# Patient Record
Sex: FEMALE | Race: WHITE | NOT HISPANIC OR LATINO | Employment: FULL TIME | URBAN - METROPOLITAN AREA
[De-identification: names, ages, dates, MRNs, and addresses within clinical notes are randomized per-mention and may not be internally consistent; named-entity substitution may affect disease eponyms.]

---

## 2017-03-27 ENCOUNTER — OFFICE VISIT (OUTPATIENT)
Dept: FAMILY MEDICINE CLINIC | Facility: CLINIC | Age: 22
End: 2017-03-27

## 2017-03-27 VITALS
TEMPERATURE: 99 F | SYSTOLIC BLOOD PRESSURE: 110 MMHG | BODY MASS INDEX: 22.92 KG/M2 | OXYGEN SATURATION: 99 % | WEIGHT: 142.6 LBS | DIASTOLIC BLOOD PRESSURE: 70 MMHG | HEART RATE: 76 BPM | HEIGHT: 66 IN

## 2017-03-27 DIAGNOSIS — J02.9 ACUTE PHARYNGITIS, UNSPECIFIED ETIOLOGY: ICD-10-CM

## 2017-03-27 DIAGNOSIS — J20.9 ACUTE BRONCHITIS, UNSPECIFIED ORGANISM: Primary | ICD-10-CM

## 2017-03-27 LAB
EXPIRATION DATE: NORMAL
FLUAV AG NPH QL: NEGATIVE
FLUBV AG NPH QL: NEGATIVE
INTERNAL CONTROL: NORMAL
Lab: NORMAL

## 2017-03-27 PROCEDURE — 87804 INFLUENZA ASSAY W/OPTIC: CPT | Performed by: FAMILY MEDICINE

## 2017-03-27 PROCEDURE — 99213 OFFICE O/P EST LOW 20 MIN: CPT | Performed by: FAMILY MEDICINE

## 2017-03-27 RX ORDER — AMOXICILLIN AND CLAVULANATE POTASSIUM 875; 125 MG/1; MG/1
1 TABLET, FILM COATED ORAL 2 TIMES DAILY
Qty: 20 TABLET | Refills: 0 | Status: SHIPPED | OUTPATIENT
Start: 2017-03-27 | End: 2017-09-21

## 2017-03-27 RX ORDER — ALBUTEROL SULFATE 90 UG/1
2 AEROSOL, METERED RESPIRATORY (INHALATION) EVERY 4 HOURS PRN
Qty: 3 INHALER | Refills: 1 | Status: SHIPPED | OUTPATIENT
Start: 2017-03-27 | End: 2017-09-06 | Stop reason: SDUPTHER

## 2017-03-27 NOTE — PROGRESS NOTES
"  Chief Complaint   Patient presents with   • Nasal Congestion     pt ois been with conestion ,chills and sore throat since yesterday ,she is taking loratadine,       Subjective.../HPI  Patient present today with sore throat and muscle aches and chills and rattling in chest    I have reviewed the patient's medical history in detail and updated the computerized patient record.    No family history on file.    Social History     Social History   • Marital status: Single     Spouse name: N/A   • Number of children: N/A   • Years of education: N/A     Occupational History   • Not on file.     Social History Main Topics   • Smoking status: Never Smoker   • Smokeless tobacco: Not on file   • Alcohol use Yes      Comment: \"socially\"   • Drug use: No   • Sexual activity: Defer     Other Topics Concern   • Not on file     Social History Narrative       Review of Systems:   Review of Systems   Constitutional: Positive for chills and fatigue.   HENT: Positive for congestion, ear pain (both ears), sneezing, sore throat and voice change.    Eyes: Negative.    Respiratory: Positive for cough.    Cardiovascular: Negative.    Gastrointestinal: Negative.    Endocrine: Negative.    Genitourinary: Negative.    Allergic/Immunologic: Negative.    Neurological: Negative.    Hematological: Negative.    Psychiatric/Behavioral: Negative.        Objective:  Vital Signs     Vitals:    03/27/17 1838   BP: 110/70   BP Location: Left arm   Patient Position: Sitting   Pulse: 76   Temp: 99 °F (37.2 °C)   TempSrc: Oral   SpO2: 99%   Weight: 142 lb 9.6 oz (64.7 kg)   Height: 65.5\" (166.4 cm)     Physical Exam   Constitutional: She is oriented to person, place, and time. She appears well-developed and well-nourished.   HENT:   Head: Normocephalic.   Right Ear: External ear normal.   Left Ear: External ear normal.   Red throat   Eyes: Pupils are equal, round, and reactive to light.   Neck: Normal range of motion.   Cardiovascular: Normal rate and " regular rhythm.    Pulmonary/Chest: Effort normal. She has rales (slight coarse breath sounds).   Abdominal: Soft.   Musculoskeletal: Normal range of motion.   Neurological: She is alert and oriented to person, place, and time.   Skin: Skin is warm and dry.        Results Review:      REVIEWED AND DISCUSSED LAB RESULTS WITH PATIENT and REVIEWED AND DISCUSSED CLINICAL RESULTS WITH PATIENT                          Current Outpatient Prescriptions:   •  albuterol (PROAIR HFA) 108 (90 BASE) MCG/ACT inhaler, Inhale 2 puffs Every 4 (Four) Hours As Needed for Wheezing., Disp: 3 inhaler, Rfl: 1  •  etonogestrel-ethinyl estradiol (NUVARING) 0.12-0.015 MG/24HR vaginal ring, Insert 1 each into the vagina every 28 (twenty-eight) days. Insert vaginally and leave in place for 3 consecutive weeks, then remove for 1 week., Disp: , Rfl:   •  LORATADINE ALLERGY RELIEF PO, Take  by mouth., Disp: , Rfl:   •  amoxicillin-clavulanate (AUGMENTIN) 875-125 MG per tablet, Take 1 tablet by mouth 2 (Two) Times a Day., Disp: 20 tablet, Rfl: 0  •  Azelastine-Fluticasone (DYMISTA) 137-50 MCG/ACT suspension, 1 spray into each nostril 2 (two) times a day., Disp: 1 bottle, Rfl: 11    Procedures    Assessment/Plan     Diagnoses and all orders for this visit:    Acute bronchitis, unspecified organism  -     POCT Influenza A/B    Acute pharyngitis, unspecified etiology  -     POCT Influenza A/B    Other orders  -     amoxicillin-clavulanate (AUGMENTIN) 875-125 MG per tablet; Take 1 tablet by mouth 2 (Two) Times a Day.  -     albuterol (PROAIR HFA) 108 (90 BASE) MCG/ACT inhaler; Inhale 2 puffs Every 4 (Four) Hours As Needed for Wheezing.       pt wants to take meds and hold on rapid strep    Liborio Marcos Pedro Jr., MD  03/27/17  6:53 PM

## 2017-03-29 ENCOUNTER — TELEPHONE (OUTPATIENT)
Dept: FAMILY MEDICINE CLINIC | Facility: CLINIC | Age: 22
End: 2017-03-29

## 2017-03-29 NOTE — TELEPHONE ENCOUNTER
----- Message from Corin Frausto sent at 3/29/2017  9:35 AM EDT -----  Ph [phone]  [facility] [address]  Pt was given amoxicillin-clavulanate (AUGMENTIN) 875-125 MG per tablet and she now has diarrhea, she wants to know if she can get something else called in     Also she wants to know if she can get a note cause she missed school?    lov 3/27/17 for bronchitis  NKDA

## 2017-09-13 RX ORDER — ALBUTEROL SULFATE 90 UG/1
2 AEROSOL, METERED RESPIRATORY (INHALATION)
Qty: 25.5 G | Refills: 1 | Status: SHIPPED | OUTPATIENT
Start: 2017-09-13 | End: 2020-07-14 | Stop reason: SDUPTHER

## 2017-09-21 ENCOUNTER — HOSPITAL ENCOUNTER (OUTPATIENT)
Dept: GENERAL RADIOLOGY | Facility: HOSPITAL | Age: 22
Discharge: HOME | End: 2017-09-21
Attending: FAMILY MEDICINE | Admitting: FAMILY MEDICINE

## 2017-09-21 ENCOUNTER — OFFICE VISIT (OUTPATIENT)
Dept: FAMILY MEDICINE CLINIC | Facility: CLINIC | Age: 22
End: 2017-09-21

## 2017-09-21 VITALS
TEMPERATURE: 98.7 F | HEIGHT: 66 IN | OXYGEN SATURATION: 98 % | BODY MASS INDEX: 22.08 KG/M2 | SYSTOLIC BLOOD PRESSURE: 100 MMHG | HEART RATE: 84 BPM | WEIGHT: 137.4 LBS | DIASTOLIC BLOOD PRESSURE: 64 MMHG

## 2017-09-21 DIAGNOSIS — S99.922A INJURY OF LEFT FOOT, INITIAL ENCOUNTER: Primary | ICD-10-CM

## 2017-09-21 PROCEDURE — 73630 X-RAY EXAM OF FOOT: CPT

## 2017-09-21 PROCEDURE — 99213 OFFICE O/P EST LOW 20 MIN: CPT | Performed by: FAMILY MEDICINE

## 2017-09-21 NOTE — PROGRESS NOTES
"  Chief Complaint   Patient presents with   • Ankle Injury     last sunday pt felt on hole and has swelling and pain in left foot       Subjective.../HPI  Patient present today with hx of stepping off curb x 4 days ago and pain and swelling and bruising in left lat foot  I have reviewed the patient's medical history in detail and updated the computerized patient record.    History reviewed. No pertinent family history.    Social History     Social History   • Marital status: Single     Spouse name: N/A   • Number of children: N/A   • Years of education: N/A     Occupational History   • Not on file.     Social History Main Topics   • Smoking status: Never Smoker   • Smokeless tobacco: Not on file   • Alcohol use Yes      Comment: \"socially\"   • Drug use: No   • Sexual activity: Defer     Other Topics Concern   • Not on file     Social History Narrative       Review of Systems:   Review of Systems   Musculoskeletal: Positive for joint swelling and myalgias.        Left foot and ankle pain       Objective:  Vital Signs     Vitals:    09/21/17 1107   BP: 100/64   BP Location: Left arm   Patient Position: Sitting   Pulse: 84   Temp: 98.7 °F (37.1 °C)   SpO2: 98%   Weight: 137 lb 6.4 oz (62.3 kg)   Height: 66\" (167.6 cm)     Physical Exam   Constitutional: She is oriented to person, place, and time. She appears well-developed and well-nourished.   HENT:   Head: Normocephalic.   Eyes: Pupils are equal, round, and reactive to light.   Neck: Normal range of motion.   Cardiovascular: Normal rate, regular rhythm and normal heart sounds.    Pulmonary/Chest: Effort normal.   Abdominal: Soft.   Musculoskeletal: Normal range of motion. She exhibits tenderness (pain and bruising over proximal left 5th metatarsal).   Neurological: She is alert and oriented to person, place, and time.   Skin: Skin is warm and dry.        Results Review:      REVIEWED AND DISCUSSED CLINICAL RESULTS WITH PATIENT                          Current " Outpatient Prescriptions:   •  albuterol (PROAIR HFA) 108 (90 Base) MCG/ACT inhaler, Inhale 2 puffs 4 (Four) Times a Day., Disp: 25.5 g, Rfl: 1  •  etonogestrel-ethinyl estradiol (NUVARING) 0.12-0.015 MG/24HR vaginal ring, Insert 1 each into the vagina every 28 (twenty-eight) days. Insert vaginally and leave in place for 3 consecutive weeks, then remove for 1 week., Disp: , Rfl:   •  LORATADINE ALLERGY RELIEF PO, Take  by mouth., Disp: , Rfl:     Procedures    Assessment/Plan     Diagnoses and all orders for this visit:    Injury of left foot, initial encounter  -     XR Foot 3+ View Left         Liborio Marcos Pedro Jr., MD  09/21/17  12:24 PM

## 2017-09-22 ENCOUNTER — TELEPHONE (OUTPATIENT)
Dept: FAMILY MEDICINE CLINIC | Facility: CLINIC | Age: 22
End: 2017-09-22

## 2017-09-22 NOTE — TELEPHONE ENCOUNTER
----- Message from Corin Frausto sent at 9/22/2017 12:36 PM EDT -----  Ph [phone]    X-ray LT foot results   Last office visit 9/21/17

## 2017-09-26 ENCOUNTER — TELEPHONE (OUTPATIENT)
Dept: FAMILY MEDICINE CLINIC | Facility: CLINIC | Age: 22
End: 2017-09-26

## 2018-01-05 ENCOUNTER — OFFICE VISIT (OUTPATIENT)
Dept: FAMILY MEDICINE CLINIC | Facility: CLINIC | Age: 23
End: 2018-01-05

## 2018-01-05 VITALS
SYSTOLIC BLOOD PRESSURE: 102 MMHG | HEART RATE: 78 BPM | HEIGHT: 66 IN | DIASTOLIC BLOOD PRESSURE: 70 MMHG | OXYGEN SATURATION: 98 % | BODY MASS INDEX: 22.02 KG/M2 | WEIGHT: 137 LBS | TEMPERATURE: 98 F

## 2018-01-05 DIAGNOSIS — S93.491A SPRAIN OF POSTERIOR TALOFIBULAR LIGAMENT OF RIGHT ANKLE, INITIAL ENCOUNTER: Primary | ICD-10-CM

## 2018-01-05 DIAGNOSIS — Z23 NEED FOR TETANUS BOOSTER: ICD-10-CM

## 2018-01-05 DIAGNOSIS — J45.20 MILD INTERMITTENT ASTHMA, UNSPECIFIED WHETHER COMPLICATED: ICD-10-CM

## 2018-01-05 PROBLEM — J45.909 ASTHMA: Status: ACTIVE | Noted: 2018-01-05

## 2018-01-05 PROBLEM — Z30.44 ENCOUNTER FOR SURVEILLANCE OF VAGINAL RING HORMONAL CONTRACEPTIVE DEVICE: Status: ACTIVE | Noted: 2018-01-05

## 2018-01-05 PROBLEM — J30.2 SEASONAL ALLERGIES: Status: ACTIVE | Noted: 2018-01-05

## 2018-01-05 PROCEDURE — 90714 TD VACC NO PRESV 7 YRS+ IM: CPT | Performed by: FAMILY MEDICINE

## 2018-01-05 PROCEDURE — 90471 IMMUNIZATION ADMIN: CPT | Performed by: FAMILY MEDICINE

## 2018-01-05 PROCEDURE — 99214 OFFICE O/P EST MOD 30 MIN: CPT | Performed by: FAMILY MEDICINE

## 2018-01-05 NOTE — PROGRESS NOTES
Subjective   Jeanne Wagner is a 22 y.o. female.     Chief Complaint   Patient presents with   • Asthma     follow up pt doing well no complains       HPI     Patient is +22-year-old female here to establish care and FU asthma.  She has a positive history of asthma and fibromyalgia.  No SOA or wheezing last used inhaler 3 months ago.  LMP was 12/29/17 - on Nuva Ring.  Without contraception she does not have menses. Exercises on days she doesn't have work.     She also has a history of repetitive ankle strains.  She twisted her ankle a couple weeks ago and feels edematous on the right side.  She has seen physical therapists in the past and has home exercises with elastic bands she doesn't regular basis.  He also played soccer competitively in things that she probably injured her ankle repetitively during those years.    The following portions of the patient's history were reviewed and updated as appropriate: allergies, current medications, past family history, past medical history, past social history, past surgical history and problem list.    Review of Systems   Constitutional: Negative for fever and unexpected weight change.   HENT: Negative for dental problem.    Respiratory: Negative for shortness of breath.    Cardiovascular: Negative for chest pain.   Gastrointestinal: Negative for blood in stool.   Genitourinary: Negative for dysuria.   Skin: Negative for rash.   Allergic/Immunologic: Negative for environmental allergies.   Neurological: Negative for syncope.   Psychiatric/Behavioral: The patient is not nervous/anxious.    All other systems reviewed and are negative.      Objective  Vitals:    01/05/18 1428   BP: 102/70   Pulse: 78   Temp: 98 °F (36.7 °C)   SpO2: 98%        Physical Exam   Constitutional: She is oriented to person, place, and time. She appears well-developed and well-nourished. No distress.   HENT:   Head: Normocephalic.   Right Ear: External ear normal.   Left Ear: External ear normal.    Nose: Nose normal.   Eyes: EOM are normal.   Cardiovascular: Normal rate, regular rhythm, normal heart sounds and intact distal pulses.    No murmur heard.  Pulmonary/Chest: Effort normal and breath sounds normal. No respiratory distress.   Musculoskeletal: Normal range of motion.   Right ankle more edematous than the left, tender along the posterior aspect of the malleolus.  Able to bear weight and walk with a normal gait.   Neurological: She is alert and oriented to person, place, and time.   Skin: Skin is warm and dry. No rash noted.   Psychiatric: She has a normal mood and affect. Her behavior is normal. Judgment and thought content normal.   Nursing note and vitals reviewed.        Current Outpatient Prescriptions:   •  albuterol (PROAIR HFA) 108 (90 Base) MCG/ACT inhaler, Inhale 2 puffs 4 (Four) Times a Day., Disp: 25.5 g, Rfl: 1  •  etonogestrel-ethinyl estradiol (NUVARING) 0.12-0.015 MG/24HR vaginal ring, Insert 1 each into the vagina every 28 (twenty-eight) days. Insert vaginally and leave in place for 3 consecutive weeks, then remove for 1 week., Disp: , Rfl:   •  LORATADINE ALLERGY RELIEF PO, Take  by mouth., Disp: , Rfl:     Procedures    Lab Results (most recent)     None          Assessment/Plan   Jeanne was seen today for asthma.    Diagnoses and all orders for this visit:    Sprain of posterior talofibular ligament of right ankle, initial encounter    Mild intermittent asthma, unspecified whether complicated    Need for tetanus booster  -     Td Vaccine Greater Than or Equal To 8yo Preservative Free IM      ANKLE: RICE, continue home exercises, ibuprofen when necessary  Asthma well-controlled on current therapy.  Continue follow-up with gynecologist, likely have IUD placed.    Return if symptoms worsen or fail to improve.    15 minutes was spent of the 25 minute visit in direct counseling and coordination of care regarding:   Encounter Diagnoses   Name Primary?   • Mild intermittent asthma,  unspecified whether complicated    • Need for tetanus booster    • Sprain of posterior talofibular ligament of right ankle, initial encounter Yes         Giana Alva MD

## 2018-08-07 ENCOUNTER — OFFICE VISIT (OUTPATIENT)
Dept: FAMILY MEDICINE CLINIC | Facility: CLINIC | Age: 23
End: 2018-08-07

## 2018-08-07 VITALS
HEIGHT: 66 IN | WEIGHT: 137 LBS | TEMPERATURE: 98.5 F | DIASTOLIC BLOOD PRESSURE: 64 MMHG | SYSTOLIC BLOOD PRESSURE: 98 MMHG | BODY MASS INDEX: 22.02 KG/M2 | OXYGEN SATURATION: 98 % | HEART RATE: 78 BPM

## 2018-08-07 DIAGNOSIS — S93.491A SPRAIN OF POSTERIOR TALOFIBULAR LIGAMENT OF RIGHT ANKLE, INITIAL ENCOUNTER: Primary | ICD-10-CM

## 2018-08-07 PROCEDURE — 99214 OFFICE O/P EST MOD 30 MIN: CPT | Performed by: FAMILY MEDICINE

## 2018-08-07 NOTE — PROGRESS NOTES
"    Jeanne Wagner is a 23 y.o. female.     Chief Complaint   Patient presents with   • Ankle Pain     right ankle pain  pt injured in the past now pain is back        HPI     Pt is a pleasant 23 y.o. YO female here for R ankle pain.     R ankle pain: new problem to me, she has injured in the past, pain in the lateral R ankle, she feels that it is swollen, tender, weak.  It first started her sophomore year of highschool, she does not recall a specific injury but has been playing sports.  She has been treated with crutches, PT, NSAIDs and Ice in the past that did resolve symptoms but have recurred. She can run for 6 miles and it doesn't get worst, but it feels \"tired\" like it will give out on her but it hasn't.  She does have a topical med that does help.      The following portions of the patient's history were reviewed and updated as appropriate: allergies, current medications, past family history, past medical history, past social history, past surgical history and problem list.    Review of Systems   Constitutional: Negative for fatigue and fever.   Musculoskeletal: Positive for gait problem and myalgias.        Right ankle pain       Objective  Vitals:    08/07/18 0828   BP: 98/64   Pulse: 78   Temp: 98.5 °F (36.9 °C)   SpO2: 98%        Physical Exam   Constitutional: She is oriented to person, place, and time. She appears well-developed and well-nourished. No distress.   HENT:   Head: Normocephalic.   Nose: Nose normal.   Eyes: EOM are normal. No scleral icterus.   Pulmonary/Chest: Effort normal. No respiratory distress.   Musculoskeletal: Normal range of motion.   R foot with mild edema in the posterior to the malleolus on the lateral aspect of the ankle.  No tenderness over the medial or lateral malleolus, navicular, calcaneus.  Normal range of motion.  No pain with flexion or extension of the foot.  Able to bear weight with a normal gait.   Neurological: She is alert and oriented to person, place, and " time.   Skin: Skin is warm and dry. No rash noted.   Psychiatric: She has a normal mood and affect. Her behavior is normal. Judgment and thought content normal.   Nursing note and vitals reviewed.        Current Outpatient Prescriptions:   •  Levonorgestrel (CHARLIE) 13.5 MG intrauterine device IUD, 1 each by Intrauterine route 1 (One) Time., Disp: , Rfl:   •  LORATADINE ALLERGY RELIEF PO, Take  by mouth., Disp: , Rfl:   •  albuterol (PROAIR HFA) 108 (90 Base) MCG/ACT inhaler, Inhale 2 puffs 4 (Four) Times a Day., Disp: 25.5 g, Rfl: 1  •  etonogestrel-ethinyl estradiol (NUVARING) 0.12-0.015 MG/24HR vaginal ring, Insert 1 each into the vagina every 28 (twenty-eight) days. Insert vaginally and leave in place for 3 consecutive weeks, then remove for 1 week., Disp: , Rfl:     Procedures    Lab Results (most recent)     None              Jeanne was seen today for ankle pain.    Diagnoses and all orders for this visit:    Sprain of posterior talofibular ligament of right ankle, initial encounter  -     Ambulatory Referral to Physical Therapy Evaluate and treat      Patient with a new problem to me of sprain of the right ankle.  It is not improving, continue with NSAIDs and ice.  Referral to physical therapy prescribed.  She does not want surgery at this time, therefore no MRI at this time.  She does affirm some instability think would improve with ankle exercises.  She is okay to continue running as long as this does not exacerbate her pain.    Return in about 6 weeks (around 9/18/2018), or if symptoms worsen or fail to improve, for Recheck R ankle pain.      Giana Alva MD

## 2018-12-19 ENCOUNTER — OFFICE VISIT (OUTPATIENT)
Dept: FAMILY MEDICINE CLINIC | Facility: CLINIC | Age: 23
End: 2018-12-19

## 2018-12-19 VITALS
HEIGHT: 66 IN | SYSTOLIC BLOOD PRESSURE: 112 MMHG | BODY MASS INDEX: 21.69 KG/M2 | OXYGEN SATURATION: 98 % | WEIGHT: 135 LBS | TEMPERATURE: 98.2 F | DIASTOLIC BLOOD PRESSURE: 70 MMHG | HEART RATE: 67 BPM

## 2018-12-19 DIAGNOSIS — J06.9 ACUTE URI: Primary | ICD-10-CM

## 2018-12-19 PROCEDURE — 99213 OFFICE O/P EST LOW 20 MIN: CPT | Performed by: FAMILY MEDICINE

## 2018-12-19 RX ORDER — PSEUDOEPHEDRINE HCL 120 MG/1
120 TABLET, FILM COATED, EXTENDED RELEASE ORAL EVERY 12 HOURS
Qty: 45 TABLET | Refills: 3 | Status: SHIPPED | OUTPATIENT
Start: 2018-12-19 | End: 2019-01-11

## 2018-12-19 RX ORDER — GUAIFENESIN AND CODEINE PHOSPHATE 100; 10 MG/5ML; MG/5ML
5 SOLUTION ORAL 3 TIMES DAILY PRN
Qty: 180 ML | Refills: 0 | Status: SHIPPED | OUTPATIENT
Start: 2018-12-19 | End: 2019-01-11

## 2018-12-19 NOTE — PROGRESS NOTES
Jeanne Wagner is a 23 y.o. female.     Chief Complaint   Patient presents with   • Cough     congestion and cough for about 4 days and sore throat       HPI     Pt is a pleasant 23 y.o. YO female here for respiratory infection for 4 days.  She has cough, congestion, sore throat and laryngitis.  She has no shortness of breath or wheezing.  She is not needing her inhaler more often.  She does have a nocturnal cough that is productive.  Sick contacts with young children, no Gi Sx.      The following portions of the patient's history were reviewed and updated as appropriate: allergies, current medications, past family history, past medical history, past social history, past surgical history and problem list.    Review of Systems   HENT: Positive for congestion, postnasal drip, rhinorrhea and sore throat.    Respiratory: Positive for cough, chest tightness and shortness of breath.    Allergic/Immunologic: Positive for environmental allergies.       Objective  Vitals:    12/19/18 1143   BP: 112/70   Pulse: 67   Temp: 98.2 °F (36.8 °C)   SpO2: 98%        Physical Exam   Constitutional: She is oriented to person, place, and time. She appears well-developed and well-nourished. No distress.   HENT:   Head: Normocephalic.   Right Ear: External ear normal.   Left Ear: External ear normal.   Nose: Nose normal.   Mouth/Throat: Oropharynx is clear and moist. No oropharyngeal exudate.   No sinus tenderness, erythema in the posterior oropharynx.   Eyes: Conjunctivae and EOM are normal. Pupils are equal, round, and reactive to light.   Cardiovascular: Normal rate, regular rhythm, normal heart sounds and intact distal pulses.   No murmur heard.  Pulmonary/Chest: Effort normal and breath sounds normal. No stridor. No respiratory distress. She has no wheezes.   Abdominal: Soft. Bowel sounds are normal. She exhibits no distension.   Musculoskeletal: Normal range of motion.   Neurological: She is alert and oriented to person,  place, and time.   Skin: Skin is warm and dry. No rash noted.   Psychiatric: She has a normal mood and affect. Her behavior is normal. Judgment and thought content normal.   Nursing note and vitals reviewed.        Current Outpatient Medications:   •  albuterol (PROAIR HFA) 108 (90 Base) MCG/ACT inhaler, Inhale 2 puffs 4 (Four) Times a Day., Disp: 25.5 g, Rfl: 1  •  Levonorgestrel (CHARLIE) 13.5 MG intrauterine device IUD, 1 each by Intrauterine route 1 (One) Time., Disp: , Rfl:   •  LORATADINE ALLERGY RELIEF PO, Take  by mouth., Disp: , Rfl:   •  etonogestrel-ethinyl estradiol (NUVARING) 0.12-0.015 MG/24HR vaginal ring, Insert 1 each into the vagina every 28 (twenty-eight) days. Insert vaginally and leave in place for 3 consecutive weeks, then remove for 1 week., Disp: , Rfl:   •  guaifenesin-codeine (GUAIFENESIN AC) 100-10 MG/5ML liquid, Take 5 mL by mouth 3 (Three) Times a Day As Needed for Cough., Disp: 180 mL, Rfl: 0  •  pseudoephedrine (SUDAFED 12 HOUR) 120 MG 12 hr tablet, Take 1 tablet by mouth Every 12 (Twelve) Hours., Disp: 45 tablet, Rfl: 3    Procedures    Lab Results (most recent)     None              Jeanne was seen today for cough.    Diagnoses and all orders for this visit:    Acute URI  -     guaifenesin-codeine (GUAIFENESIN AC) 100-10 MG/5ML liquid; Take 5 mL by mouth 3 (Three) Times a Day As Needed for Cough.  -     pseudoephedrine (SUDAFED 12 HOUR) 120 MG 12 hr tablet; Take 1 tablet by mouth Every 12 (Twelve) Hours.      URI likely viral.  Supportive care as above.  Rest and vitamin C.  Normal exam at this time, no sign of asthma exacerbation at this time no wheezing.  If not improving in 1 week okay to call for Augmentin.  If she gets signs of asthma exacerbation, return for further evaluation.    Return if symptoms worsen or fail to improve.      Giana Alva MD

## 2019-01-11 ENCOUNTER — OFFICE VISIT (OUTPATIENT)
Dept: FAMILY MEDICINE CLINIC | Facility: CLINIC | Age: 24
End: 2019-01-11

## 2019-01-11 VITALS
WEIGHT: 139 LBS | HEIGHT: 66 IN | DIASTOLIC BLOOD PRESSURE: 64 MMHG | OXYGEN SATURATION: 100 % | SYSTOLIC BLOOD PRESSURE: 100 MMHG | BODY MASS INDEX: 22.34 KG/M2 | TEMPERATURE: 98.9 F | HEART RATE: 104 BPM

## 2019-01-11 DIAGNOSIS — R42 VERTIGO: ICD-10-CM

## 2019-01-11 DIAGNOSIS — H65.91 FLUID LEVEL BEHIND TYMPANIC MEMBRANE OF RIGHT EAR: Primary | ICD-10-CM

## 2019-01-11 PROCEDURE — 99214 OFFICE O/P EST MOD 30 MIN: CPT | Performed by: FAMILY MEDICINE

## 2019-01-11 RX ORDER — PREDNISONE 20 MG/1
40 TABLET ORAL DAILY
Qty: 10 TABLET | Refills: 0 | Status: SHIPPED | OUTPATIENT
Start: 2019-01-11 | End: 2019-01-11 | Stop reason: SDUPTHER

## 2019-01-11 RX ORDER — PREDNISONE 20 MG/1
40 TABLET ORAL DAILY
Qty: 10 TABLET | Refills: 0 | Status: SHIPPED | OUTPATIENT
Start: 2019-01-11 | End: 2019-01-16

## 2019-01-11 NOTE — PROGRESS NOTES
Jeanne Wagner is a 23 y.o. female.     Chief Complaint   Patient presents with   • Dizziness   • Cough       HPI     Pt is a pleasant 23 y.o. YO female here for cough and Dizziness.  He was diagnosed with an upper respiratory infection in mid December, treated by supportive care with Sudafed and codeine cough syrup.  Sore throat and congestion have improved, no hearing loss, no fevers, No N/V/D.  Her ear would pop and hurt earlier this week, now popping consistently.       The following portions of the patient's history were reviewed and updated as appropriate: allergies, current medications, past family history, past medical history, past social history, past surgical history and problem list.    Review of Systems   Constitutional: Positive for fatigue.   Respiratory: Positive for cough.    Neurological: Positive for dizziness.       Objective  Vitals:    01/11/19 1000   BP: 100/64   Pulse: 104   Temp: 98.9 °F (37.2 °C)   SpO2: 100%        Physical Exam   Constitutional: She is oriented to person, place, and time. She appears well-developed and well-nourished. No distress.   HENT:   Head: Normocephalic.   Right Ear: External ear normal.   Left Ear: External ear normal.   Nose: Nose normal.   Fluid behind the right TM with no bulging or redness but reanna color   Eyes: EOM are normal.   Cardiovascular: Normal rate, regular rhythm, normal heart sounds and intact distal pulses.   No murmur heard.  Pulmonary/Chest: Effort normal and breath sounds normal. No respiratory distress.   Musculoskeletal: Normal range of motion.   Neurological: She is alert and oriented to person, place, and time.   Skin: Skin is warm and dry. No rash noted.   Psychiatric: She has a normal mood and affect. Her behavior is normal. Judgment and thought content normal.   Nursing note and vitals reviewed.        Current Outpatient Medications:   •  albuterol (PROAIR HFA) 108 (90 Base) MCG/ACT inhaler, Inhale 2 puffs 4 (Four) Times a Day.,  Disp: 25.5 g, Rfl: 1  •  Levonorgestrel (CHARLIE) 13.5 MG intrauterine device IUD, 1 each by Intrauterine route 1 (One) Time., Disp: , Rfl:   •  predniSONE (DELTASONE) 20 MG tablet, Take 2 tablets by mouth Daily for 5 days., Disp: 10 tablet, Rfl: 0    Procedures    Lab Results (most recent)     None              Jeanne was seen today for dizziness and cough.    Diagnoses and all orders for this visit:    Fluid level behind tympanic membrane of right ear  -     Discontinue: predniSONE (DELTASONE) 20 MG tablet; Take 2 tablets by mouth Daily for 5 days.  -     predniSONE (DELTASONE) 20 MG tablet; Take 2 tablets by mouth Daily for 5 days.    Vertigo    Patient with anew problem of vertigo-like symptoms and popping in the ear on the right, findings of fluid behind the TM, treated with prednisone, not improving with supportive care, been 3 weeks.  if not improving after one week of steorids return for further evaluation.  She seems to be improving from an upper respiratory infection standpoint.  No further need for antibiotics.  Patient agreed to use over-the-counter Mucinex to help with drainage of the sinuses.  If not improving may need to see an ENT.       Return if symptoms worsen or fail to improve.      Giana Alva MD

## 2019-04-01 NOTE — TELEPHONE ENCOUNTER
Pt requesting three refills. Two of these medications are not listed on pt's chart. Written previously by .    1) albuterol (PROAIR HFA) 108 (90 Base) MCG/ACT inhaler    2) Clindamycin Phosphate Gel 1.2%-5% Apply to face as needed    3) Ketoprofen 75 mg Take one capsules by mouth three daily for inflammation in knee     I did inform pt that  is on vacation she will return this week. These medications, including medications that are not on pt's medication list may not be authorized until  returns, or appointment may be needed.

## 2019-04-02 RX ORDER — ALBUTEROL SULFATE 90 UG/1
2 AEROSOL, METERED RESPIRATORY (INHALATION)
Qty: 25.5 G | Refills: 1 | Status: CANCELLED | OUTPATIENT
Start: 2019-04-02

## 2019-04-04 RX ORDER — KETOPROFEN 75 MG/1
75 CAPSULE ORAL 3 TIMES DAILY PRN
Qty: 60 CAPSULE | Refills: 2 | OUTPATIENT
Start: 2019-04-04 | End: 2020-02-24

## 2019-04-04 RX ORDER — CLINDAMYCIN PHOSPHATE AND BENZOYL PEROXIDE 10; 50 MG/G; MG/G
1 GEL TOPICAL DAILY
Qty: 45 G | Refills: 11 | Status: SHIPPED | OUTPATIENT
Start: 2019-04-04 | End: 2020-05-04 | Stop reason: SDUPTHER

## 2020-02-24 ENCOUNTER — APPOINTMENT (OUTPATIENT)
Dept: CT IMAGING | Facility: HOSPITAL | Age: 25
End: 2020-02-24

## 2020-02-24 ENCOUNTER — APPOINTMENT (OUTPATIENT)
Dept: GENERAL RADIOLOGY | Facility: HOSPITAL | Age: 25
End: 2020-02-24

## 2020-02-24 ENCOUNTER — HOSPITAL ENCOUNTER (EMERGENCY)
Facility: HOSPITAL | Age: 25
Discharge: HOME | End: 2020-02-24
Attending: EMERGENCY MEDICINE | Admitting: EMERGENCY MEDICINE

## 2020-02-24 VITALS
OXYGEN SATURATION: 98 % | DIASTOLIC BLOOD PRESSURE: 81 MMHG | SYSTOLIC BLOOD PRESSURE: 116 MMHG | HEIGHT: 65 IN | TEMPERATURE: 97.6 F | HEART RATE: 77 BPM | BODY MASS INDEX: 23.32 KG/M2 | WEIGHT: 140 LBS | RESPIRATION RATE: 17 BRPM

## 2020-02-24 DIAGNOSIS — S09.90XA MINOR HEAD INJURY, INITIAL ENCOUNTER: ICD-10-CM
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
## 2020-02-24 DIAGNOSIS — S16.1XXA STRAIN OF NECK MUSCLE, INITIAL ENCOUNTER: ICD-10-CM

## 2020-02-24 PROCEDURE — 96372 THER/PROPH/DIAG INJ SC/IM: CPT

## 2020-02-24 PROCEDURE — 99283 EMERGENCY DEPT VISIT LOW MDM: CPT

## 2020-02-24 PROCEDURE — 72050 X-RAY EXAM NECK SPINE 4/5VWS: CPT

## 2020-02-24 PROCEDURE — 63710000001 ONDANSETRON ODT 4 MG TABLET DISPERSIBLE: Performed by: EMERGENCY MEDICINE

## 2020-02-24 PROCEDURE — 70450 CT HEAD/BRAIN W/O DYE: CPT

## 2020-02-24 PROCEDURE — 25010000002 KETOROLAC TROMETHAMINE PER 15 MG: Performed by: EMERGENCY MEDICINE

## 2020-02-24 RX ORDER — KETOROLAC TROMETHAMINE 30 MG/ML
30 INJECTION, SOLUTION INTRAMUSCULAR; INTRAVENOUS ONCE
Status: COMPLETED | OUTPATIENT
Start: 2020-02-24 | End: 2020-02-24

## 2020-02-24 RX ORDER — NAPROXEN SODIUM 550 MG/1
550 TABLET ORAL 2 TIMES DAILY WITH MEALS
Qty: 20 TABLET | Refills: 0 | OUTPATIENT
Start: 2020-02-24 | End: 2022-01-17

## 2020-02-24 RX ORDER — ONDANSETRON 4 MG/1
4 TABLET, ORALLY DISINTEGRATING ORAL ONCE
Status: COMPLETED | OUTPATIENT
Start: 2020-02-24 | End: 2020-02-24

## 2020-02-24 RX ORDER — CYCLOBENZAPRINE HCL 10 MG
10 TABLET ORAL 3 TIMES DAILY PRN
Qty: 20 TABLET | Refills: 0 | OUTPATIENT
Start: 2020-02-24 | End: 2022-01-17

## 2020-02-24 RX ADMIN — ONDANSETRON 4 MG: 4 TABLET, ORALLY DISINTEGRATING ORAL at 11:32

## 2020-02-24 RX ADMIN — KETOROLAC TROMETHAMINE 30 MG: 30 INJECTION, SOLUTION INTRAMUSCULAR at 11:32

## 2020-02-24 NOTE — ED PROVIDER NOTES
" EMERGENCY DEPARTMENT ENCOUNTER    CHIEF COMPLAINT  Chief Complaint: nausea and headache  History given by: patient  History limited by: none  Room Number: 41/41  PMD: Giana Alva MD      HPI:  Pt is a 24 y.o. female who presents complaining of moderate nausea and headache s/p falling backwards and hitting head occurring 2 days ago. Pt states pain has not improved since onset. Pt is unsure if she lost consciousness. Pt reports she was playing tug of war with her dog, the dog let go and she fell backwards and hit her head. Pt also complains of neck pain. Pt has an IUD and denies chance of pregnancy.     Duration:  2 days  Intensity/Severity: moderate  Progression: unchanged  Associated Symptoms: neck pain  Alleviating Factors: none    PAST MEDICAL HISTORY  Active Ambulatory Problems     Diagnosis Date Noted   • Asthma 01/05/2018   • Seasonal allergies 01/05/2018   • Encounter for surveillance of vaginal ring hormonal contraceptive device 01/05/2018     Resolved Ambulatory Problems     Diagnosis Date Noted   • Acute sinusitis 09/07/2016     Past Medical History:   Diagnosis Date   • Fibromyalgia        PAST SURGICAL HISTORY  Past Surgical History:   Procedure Laterality Date   • KNEE ARTHROSCOPY Left        FAMILY HISTORY  Family History   Problem Relation Age of Onset   • Hypertension Mother    • Diabetes Father    • Hypertension Father    • Seizures Sister    • No Known Problems Brother    • Thyroid disease Paternal Grandmother    • Coronary artery disease Paternal Grandfather         CABG   • Stroke Other    • Colon cancer Other    • Breast cancer Neg Hx        SOCIAL HISTORY  Social History     Socioeconomic History   • Marital status: Single     Spouse name: Not on file   • Number of children: Not on file   • Years of education: Not on file   • Highest education level: Not on file   Tobacco Use   • Smoking status: Never Smoker   Substance and Sexual Activity   • Alcohol use: Yes     Comment: \"socially\"   • " Drug use: No   • Sexual activity: Not Currently     Comment: last enounter 3 years ago       ALLERGIES  Patient has no known allergies.    REVIEW OF SYSTEMS  Review of Systems   Constitutional: Negative for fever.   HENT: Negative for sore throat.    Eyes: Negative.    Respiratory: Negative for cough and shortness of breath.    Cardiovascular: Negative for chest pain.   Gastrointestinal: Positive for nausea. Negative for abdominal pain, diarrhea and vomiting.   Genitourinary: Negative for dysuria.   Musculoskeletal: Positive for neck pain.   Skin: Negative for rash.   Allergic/Immunologic: Negative.    Neurological: Positive for headaches. Negative for weakness and numbness.   Hematological: Negative.    Psychiatric/Behavioral: Negative.    All other systems reviewed and are negative.      PHYSICAL EXAM  ED Triage Vitals [02/24/20 1111]   Temp Heart Rate Resp BP SpO2   97.6 °F (36.4 °C) 87 -- -- 94 %      Temp src Heart Rate Source Patient Position BP Location FiO2 (%)   Tympanic -- -- -- --       Physical Exam   Constitutional: She is oriented to person, place, and time. No distress.   HENT:   Head: Normocephalic and atraumatic.   Eyes: Pupils are equal, round, and reactive to light. EOM are normal.   Neck: Normal range of motion. Neck supple. Muscular tenderness (upper) present.   No spasm or step off   Cardiovascular: Normal rate, regular rhythm and normal heart sounds.   Pulmonary/Chest: Effort normal and breath sounds normal. No respiratory distress.   Abdominal: Soft. There is no tenderness. There is no rebound and no guarding.   Musculoskeletal: Normal range of motion. She exhibits no edema.        Cervical back: She exhibits no tenderness.        Thoracic back: She exhibits no tenderness.        Lumbar back: She exhibits no tenderness.   Neurological: She is alert and oriented to person, place, and time. She has normal sensation and normal strength. Gait normal. GCS score is 15.   Skin: Skin is warm and dry.  No rash noted.   Psychiatric: Mood and affect normal.   Nursing note and vitals reviewed.    RADIOLOGY  CT Head Without Contrast   Final Result   1.  No findings of acute intracranial abnormality.           This report was finalized on 2/24/2020 1:04 PM by Dr. Bautista Mayfield M.D.          XR Spine Cervical Complete 4 or 5 View   Final Result       No acute fracture is identified. If there is further clinical concern,   MRI could be considered for further evaluation.       This report was finalized on 2/24/2020 12:05 PM by Dr. Konrad Murphy M.D.               I ordered the above noted radiological studies. Interpreted by radiologist. Reviewed by me in PACS.       PROCEDURES  Procedures    PROGRESS AND CONSULTS       1123 ordered C spine XR and CT head for further evaluation. Ordered zofran for nausea and toradol for pain    1319 pt rechecked and resting in NAD. Informed pt of labs and imaging results. Discussed plan to discharge with muscle relaxer. Pt understands and agrees with the plan. All questions have been answered.      MEDICAL DECISION MAKING  Results were reviewed/discussed with the patient and they were also made aware of online access. Pt also made aware that some labs, such as cultures, will not be resulted during ER visit and follow up with PMD is necessary.     MDM  Number of Diagnoses or Management Options     Amount and/or Complexity of Data Reviewed  Tests in the radiology section of CPT®: reviewed and ordered (CT c spine: No acute fracture is identified.    head CT: No findings of acute intracranial abnormality.  )  Independent visualization of images, tracings, or specimens: yes           DIAGNOSIS  Final diagnoses:   Fall, initial encounter   Minor head injury, initial encounter   Strain of neck muscle, initial encounter       DISPOSITION  DISCHARGE    Patient discharged in stable condition.    Reviewed implications of results, diagnosis, meds, responsibility to follow up, warning signs  and symptoms of possible worsening, potential complications and reasons to return to ER, including new or worsening sx.    Patient/Family voiced understanding of above instructions.    Discussed plan for discharge, as there is no emergent indication for admission. Patient referred to primary care provider for BP management due to today's BP. Pt/family is agreeable and understands need for follow up and repeat testing.  Pt is aware that discharge does not mean that nothing is wrong but it indicates no emergency is present that requires admission and they must continue care with follow-up as given below or physician of their choice.     FOLLOW-UP  Giana Alva MD  [address] [address]  [phone]    Schedule an appointment as soon as possible for a visit   If symptoms worsen or do not improve         Medication List      New Prescriptions    cyclobenzaprine 10 MG tablet  Commonly known as:  FLEXERIL  Take 1 tablet by mouth 3 (Three) Times a Day As Needed for Muscle Spasms.     naproxen sodium 550 MG tablet  Commonly known as:  ANAPROX  Take 1 tablet by mouth 2 (Two) Times a Day With Meals.        Stop    ketoprofen 75 MG capsule  Commonly known as:  ORUDIS              Latest Documented Vital Signs:  As of 1:24 PM  BP- 123/92 HR- 87 Temp- 97.6 °F (36.4 °C) (Tympanic) O2 sat- 94%    --  Documentation assistance provided by kami Valdes for Dr. Perez.  Information recorded by the scribe was done at my direction and has been verified and validated by me.         Marlys Valdes  02/24/20 [id number]       Rob Perez MD  02/24/20 [id number]

## 2020-02-24 NOTE — ED NOTES
Patient to er with c/o she fell while playing with her dog and hit the back of her head. Patient stated this happen on Saturday. Unsure if she blacked out with this fall/ no blood thinners reported. Patient today c/o dizziness and nausea.      Jun Cortez RN  02/24/20 1111

## 2020-05-04 RX ORDER — CLINDAMYCIN PHOSPHATE AND BENZOYL PEROXIDE 10; 50 MG/G; MG/G
1 GEL TOPICAL DAILY
Qty: 45 G | Refills: 11 | Status: SHIPPED | OUTPATIENT
Start: 2020-05-04 | End: 2021-06-02 | Stop reason: SDUPTHER

## 2020-05-05 RX ORDER — ALBUTEROL SULFATE 90 UG/1
2 AEROSOL, METERED RESPIRATORY (INHALATION)
Qty: 25.5 G | Refills: 1 | OUTPATIENT
Start: 2020-05-05

## 2020-07-14 RX ORDER — ALBUTEROL SULFATE 90 UG/1
2 AEROSOL, METERED RESPIRATORY (INHALATION)
Qty: 25.5 G | Refills: 1 | Status: SHIPPED | OUTPATIENT
Start: 2020-07-14 | End: 2020-07-14 | Stop reason: SDUPTHER

## 2020-07-14 RX ORDER — ALBUTEROL SULFATE 90 UG/1
2 AEROSOL, METERED RESPIRATORY (INHALATION)
Qty: 25.5 G | Refills: 1 | Status: SHIPPED | OUTPATIENT
Start: 2020-07-14 | End: 2021-06-02 | Stop reason: SDUPTHER

## 2021-04-16 ENCOUNTER — BULK ORDERING (OUTPATIENT)
Dept: CASE MANAGEMENT | Facility: OTHER | Age: 26
End: 2021-04-16

## 2021-04-16 DIAGNOSIS — Z23 IMMUNIZATION DUE: ICD-10-CM

## 2021-06-02 NOTE — TELEPHONE ENCOUNTER
Caller: Jeanne Wagner    Relationship: Self    Best call back number: [phone]    Medication needed: AMITRIPTYLINE 10MG (LAST FILLED , BY DR. HERNANDEZ)    Requested Prescriptions     Pending Prescriptions Disp Refills   • albuterol sulfate HFA (ProAir HFA) 108 (90 Base) MCG/ACT inhaler 25.5 g 1     Sig: Inhale 2 puffs 4 (Four) Times a Day.   • Clindamycin Phos-Benzoyl Perox 1.2-5 % gel 45 g 11     Sig: Apply 1 ampule topically to the appropriate area as directed Daily.       What additional details did the patient provide when requesting the medication: REFILLS  ON AMITRIPTYLINE AND CLINDAMYCIN.    Does the patient have less than a 3 day supply:  [] Yes  [x] No    What is the patient's preferred [id number] [address] AT [facility] [phone] [facility] [phone] FX

## 2021-06-02 NOTE — TELEPHONE ENCOUNTER
I spoke with patient and she is starting a new job and is not sure if her insurance will cover Dr. Alva. She stated after she gets her new insurance as long as Dr. Alva is in network, she will call and schedule. She is aware without an appointment, these medication could be refused.

## 2021-06-03 RX ORDER — ALBUTEROL SULFATE 90 UG/1
2 AEROSOL, METERED RESPIRATORY (INHALATION)
Qty: 25.5 G | Refills: 1 | Status: SHIPPED | OUTPATIENT
Start: 2021-06-03 | End: 2022-06-03 | Stop reason: SDUPTHER

## 2021-06-03 RX ORDER — CLINDAMYCIN PHOSPHATE AND BENZOYL PEROXIDE 10; 50 MG/G; MG/G
1 GEL TOPICAL DAILY
Qty: 45 G | Refills: 11 | Status: SHIPPED | OUTPATIENT
Start: 2021-06-03 | End: 2022-06-03

## 2022-01-27 ENCOUNTER — TELEPHONE (OUTPATIENT)
Dept: FAMILY MEDICINE CLINIC | Facility: CLINIC | Age: 27
End: 2022-01-27

## 2022-01-27 NOTE — TELEPHONE ENCOUNTER
Caller: Jeanne Wagner    Relationship to patient: Self    Best call back number: [phone]    Patient is needing: PATIENT IS REQUESTING A NOTE FOR HER WORK. SHE TESTED POSITIVE FOR COVID ON Sunday.

## 2022-06-02 RX ORDER — ALBUTEROL SULFATE 90 UG/1
2 AEROSOL, METERED RESPIRATORY (INHALATION)
Qty: 25.5 G | Refills: 1 | OUTPATIENT
Start: 2022-06-02

## 2022-06-03 DIAGNOSIS — J45.20 MILD INTERMITTENT ASTHMA, UNSPECIFIED WHETHER COMPLICATED: Primary | ICD-10-CM

## 2022-06-03 RX ORDER — ALBUTEROL SULFATE 90 UG/1
2 AEROSOL, METERED RESPIRATORY (INHALATION)
Qty: 6.7 G | Refills: 0 | Status: SHIPPED | OUTPATIENT
Start: 2022-06-03 | End: 2022-06-09 | Stop reason: SDUPTHER

## 2022-06-03 NOTE — TELEPHONE ENCOUNTER
Pt left w/o being seen. Informed pt that it is crucial she is seen before any additional refill as her last vist was >1Y. Small quantity inhaler refilled with no refill. Pt v/u      Refill verbally authorized by JUDY Baum

## 2022-06-07 ENCOUNTER — DOCUMENTATION (OUTPATIENT)
Dept: FAMILY MEDICINE CLINIC | Facility: CLINIC | Age: 27
End: 2022-06-07

## 2022-06-07 DIAGNOSIS — J45.20 MILD INTERMITTENT ASTHMA, UNSPECIFIED WHETHER COMPLICATED: ICD-10-CM

## 2022-06-08 RX ORDER — ALBUTEROL SULFATE 90 UG/1
AEROSOL, METERED RESPIRATORY (INHALATION)
Qty: 25.5 G | OUTPATIENT
Start: 2022-06-08

## 2022-06-08 NOTE — TELEPHONE ENCOUNTER
Temporary refill was sent in 6/3 and pt aware. Pt was well informed that this will not be refilled again until she is seen. Please schedule apt.

## 2022-06-09 ENCOUNTER — TELEPHONE (OUTPATIENT)
Dept: FAMILY MEDICINE CLINIC | Facility: CLINIC | Age: 27
End: 2022-06-09

## 2022-06-09 DIAGNOSIS — J45.20 MILD INTERMITTENT ASTHMA, UNSPECIFIED WHETHER COMPLICATED: ICD-10-CM

## 2022-06-09 RX ORDER — ALBUTEROL SULFATE 90 UG/1
2 AEROSOL, METERED RESPIRATORY (INHALATION)
Qty: 6.7 G | Refills: 0 | Status: SHIPPED | OUTPATIENT
Start: 2022-06-09 | End: 2022-10-07 | Stop reason: SDUPTHER

## 2022-06-09 NOTE — TELEPHONE ENCOUNTER
Caller: Jeanne Wagner    Relationship: Self    Best call back number: [phone]    What is the best time to reach you: AFTER 5PM     Who are you requesting to speak with (clinical staff, provider,  specific staff member): CLINICAL     What was the call regarding: PATIENT CALLED AND WANTS TO KNOW IF SHE CAN HAVE HER ALBUTEROL SULFATE REFILLED. SHE STATES SHE IS UNABLE TO GET INTO THE OFFICE UNTIL SHE IS FINISHED WITH [employer]CHO[employer] IN AUGUST. PLEASE CALL AND ADVISE. PATIENT WAS ALSO TOLD SHE WILL RECEIVE A EMERGENCY INHALER BUT SHE HAS NOT RECEIVED ONE     Do you require a callback: YES, IF PATIENT IS UNAVAILABLE PLEASE LEAVE A DETAILED VOICE MAIL

## 2022-10-07 ENCOUNTER — OFFICE VISIT (OUTPATIENT)
Dept: FAMILY MEDICINE CLINIC | Facility: CLINIC | Age: 27
End: 2022-10-07

## 2022-10-07 VITALS — TEMPERATURE: 98 F | BODY MASS INDEX: 25.99 KG/M2 | WEIGHT: 156 LBS | OXYGEN SATURATION: 100 % | HEIGHT: 65 IN

## 2022-10-07 DIAGNOSIS — R42 DIZZINESS: Primary | ICD-10-CM

## 2022-10-07 DIAGNOSIS — J45.20 MILD INTERMITTENT ASTHMA, UNSPECIFIED WHETHER COMPLICATED: ICD-10-CM

## 2022-10-07 DIAGNOSIS — Z13.21 ENCOUNTER FOR VITAMIN DEFICIENCY SCREENING: ICD-10-CM

## 2022-10-07 DIAGNOSIS — H65.03 BILATERAL ACUTE SEROUS OTITIS MEDIA, RECURRENCE NOT SPECIFIED: ICD-10-CM

## 2022-10-07 DIAGNOSIS — Z13.0 SCREENING FOR DEFICIENCY ANEMIA: ICD-10-CM

## 2022-10-07 PROCEDURE — 99204 OFFICE O/P NEW MOD 45 MIN: CPT | Performed by: NURSE PRACTITIONER

## 2022-10-07 RX ORDER — METHYLPREDNISOLONE 4 MG/1
TABLET ORAL
Qty: 21 EACH | Refills: 0 | Status: SHIPPED | OUTPATIENT
Start: 2022-10-07 | End: 2022-12-01

## 2022-10-07 RX ORDER — ALBUTEROL SULFATE 90 UG/1
2 AEROSOL, METERED RESPIRATORY (INHALATION)
Qty: 6.7 G | Refills: 0 | Status: SHIPPED | OUTPATIENT
Start: 2022-10-07 | End: 2022-10-28

## 2022-10-07 NOTE — PROGRESS NOTES
"Chief Complaint  Dizziness (Dizziness and nausea x3D. Last mp 9/18. P:t reports dizziness worsens when sitting and moving head down/to the side. Nausea is worse when looking down)    Subjective        Jeanne Wagner presents to [facility]  History of Present Illness  Jeanne Wagner is a 27 y.o. female who presents to the clinic today with concerns of dizziness. Patient's dizziness started 3 days ago. She notices her dizziness when she is sitting or when she moves her head too quickly. She denies ear pain or pressure. She denies congestion or headaches. She had a one time episode of changes in vision, but that was it. Standing will help her dizziness. Her dizziness lasts for a minute or so. She does feels weird after the dizzy episodes.  She denies syncope.  She denies changes in medications.  She did switch from taking Excedrin to Midol, but has not taken this for months.  Patient does have a history of migraines.  She used to have them regularly, a couple times a week, but now they are inconsistent.  She states it is connected to her fibromyalgia.  She denies recent head injuries or trauma.  Patient did report not drinking her normal 80 ounces of water a day the weekend prior and has started drinking Gatorade.  She states she is eating normally and denies vomiting or diarrhea.  She does have occasional ear popping and has some ear drainage.    Review of Systems   Constitutional: Negative for chills, fatigue and fever.   HENT: Negative for congestion.    Eyes: Negative for photophobia and visual disturbance.   Respiratory: Negative for cough and shortness of breath.    Cardiovascular: Negative for chest pain and leg swelling.   Gastrointestinal: Positive for nausea. Negative for diarrhea and vomiting.   Neurological: Positive for dizziness.      Objective   Vital Signs:  Temp 98 °F (36.7 °C)   Ht 165.1 cm (65\")   Wt 70.8 kg (156 lb)   SpO2 100%   BMI 25.96 kg/m²   Estimated " "body mass index is 25.96 kg/m² as calculated from the following:    Height as of this encounter: 165.1 cm (65\").    Weight as of this encounter: 70.8 kg (156 lb).          Physical Exam  Vitals reviewed.   Constitutional:       General: She is not in acute distress.     Appearance: Normal appearance. She is not ill-appearing, toxic-appearing or diaphoretic.   HENT:      Head: Normocephalic and atraumatic.      Right Ear: A middle ear effusion (serous fluid, no erythema ) is present. Tympanic membrane is not scarred, perforated, erythematous, retracted or bulging.      Left Ear: A middle ear effusion (serous fluid, no erythema) is present. Tympanic membrane is not scarred, perforated, erythematous, retracted or bulging.   Eyes:      General: No scleral icterus.        Right eye: No discharge.         Left eye: No discharge.      Extraocular Movements: Extraocular movements intact.      Pupils: Pupils are equal, round, and reactive to light.   Cardiovascular:      Rate and Rhythm: Normal rate and regular rhythm.      Heart sounds: Normal heart sounds.   Pulmonary:      Effort: No respiratory distress.      Breath sounds: Normal breath sounds.   Neurological:      General: No focal deficit present.      Mental Status: She is alert and oriented to person, place, and time.      Motor: No weakness.      Gait: Gait normal.   Psychiatric:         Attention and Perception: Attention normal.         Mood and Affect: Mood normal.         Speech: Speech normal.         Behavior: Behavior normal.         Thought Content: Thought content normal.         Judgment: Judgment normal.        Result Review :                Assessment and Plan {CC Problem List  Visit Diagnosis   ROS  Review (Popup)  Health Maintenance  Quality  BestPractice  Medications  SmartSets  SnapShot Encounters  Media :23}  Diagnoses and all orders for this visit:    1. Dizziness (Primary)  -     Comprehensive metabolic panel  -     Vitamin D 25 " Hydroxy  -     CBC w AUTO Differential  -     methylPREDNISolone (MEDROL) 4 MG dose pack; Take as directed on package instructions.  Dispense: 21 each; Refill: 0    2. Mild intermittent asthma, unspecified whether complicated  -     albuterol sulfate HFA (ProAir HFA) 108 (90 Base) MCG/ACT inhaler; Inhale 2 puffs 4 (Four) Times a Day.  Dispense: 6.7 g; Refill: 0    3. Bilateral acute serous otitis media, recurrence not specified  -     methylPREDNISolone (MEDROL) 4 MG dose pack; Take as directed on package instructions.  Dispense: 21 each; Refill: 0    4. Screening for deficiency anemia  -     CBC w AUTO Differential    5. Encounter for vitamin deficiency screening  -     Vitamin D 25 Hydroxy      Patient presents today with some dizziness and nausea.  Patient denies other neurologic deficits and neuro exam within normal limits today.  Patient does have presence of serous otitis media bilaterally.  This may be contributing to her symptoms.  She also has a history of migraines which may be contributing as well.  For now, we will treat patient with Medrol Dosepak to help with serous otitis media. Patient also encouraged to use Flonase.  Patient has not had lab work done since 2016, so we will order routine CBC and CMP to rule out electrolyte deficiency or anemia.  Orthostatic blood pressures within normal limits today.  Patient advised to go to urgent care or ED for any worsening dizziness, syncope, changes in vision, or changes in neurologic status.  Patient should follow-up sooner symptoms do not improve.  Patient advised to take steroid in the morning with food.  Patient's albuterol refilled today.         Follow Up   Return if symptoms worsen or fail to improve.  Patient was given instructions and counseling regarding her condition or for health maintenance advice. Please see specific information pulled into the AVS if appropriate.       Answers for HPI/ROS submitted by the patient on 10/7/2022  Please describe  your symptoms.: I have been dizzy and nausous consistantly for going on 3 days.  Have you had these symptoms before?: No  How long have you been having these symptoms?: 1-4 days  Please list any medications you are currently taking for this condition.: N/a  What is the primary reason for your visit?: Other    Electronically signed by JUDY Baum, 10/07/22, 11:19 AM EDT.

## 2022-10-08 LAB
25(OH)D3+25(OH)D2 SERPL-MCNC: 41.7 NG/ML (ref 30–100)
ALBUMIN SERPL-MCNC: 5.1 G/DL (ref 3.5–5.2)
ALBUMIN/GLOB SERPL: 2.8 G/DL
ALP SERPL-CCNC: 56 U/L (ref 39–117)
ALT SERPL-CCNC: 17 U/L (ref 1–33)
AST SERPL-CCNC: 19 U/L (ref 1–32)
BASOPHILS # BLD AUTO: 0.01 10*3/MM3 (ref 0–0.2)
BASOPHILS NFR BLD AUTO: 0.1 % (ref 0–1.5)
BILIRUB SERPL-MCNC: 0.5 MG/DL (ref 0–1.2)
BUN SERPL-MCNC: 10 MG/DL (ref 6–20)
BUN/CREAT SERPL: 15.4 (ref 7–25)
CALCIUM SERPL-MCNC: 9.5 MG/DL (ref 8.6–10.5)
CHLORIDE SERPL-SCNC: 103 MMOL/L (ref 98–107)
CO2 SERPL-SCNC: 26.4 MMOL/L (ref 22–29)
CREAT SERPL-MCNC: 0.65 MG/DL (ref 0.57–1)
EGFRCR SERPLBLD CKD-EPI 2021: 122.4 ML/MIN/1.73
EOSINOPHIL # BLD AUTO: 0.11 10*3/MM3 (ref 0–0.4)
EOSINOPHIL NFR BLD AUTO: 1.5 % (ref 0.3–6.2)
ERYTHROCYTE [DISTWIDTH] IN BLOOD BY AUTOMATED COUNT: 12.1 % (ref 12.3–15.4)
GLOBULIN SER CALC-MCNC: 1.8 GM/DL
GLUCOSE SERPL-MCNC: 86 MG/DL (ref 65–99)
HCT VFR BLD AUTO: 42.1 % (ref 34–46.6)
HGB BLD-MCNC: 13.7 G/DL (ref 12–15.9)
IMM GRANULOCYTES # BLD AUTO: 0.02 10*3/MM3 (ref 0–0.05)
IMM GRANULOCYTES NFR BLD AUTO: 0.3 % (ref 0–0.5)
LYMPHOCYTES # BLD AUTO: 2.13 10*3/MM3 (ref 0.7–3.1)
LYMPHOCYTES NFR BLD AUTO: 30 % (ref 19.6–45.3)
MCH RBC QN AUTO: 29.8 PG (ref 26.6–33)
MCHC RBC AUTO-ENTMCNC: 32.5 G/DL (ref 31.5–35.7)
MCV RBC AUTO: 91.7 FL (ref 79–97)
MONOCYTES # BLD AUTO: 0.42 10*3/MM3 (ref 0.1–0.9)
MONOCYTES NFR BLD AUTO: 5.9 % (ref 5–12)
NEUTROPHILS # BLD AUTO: 4.41 10*3/MM3 (ref 1.7–7)
NEUTROPHILS NFR BLD AUTO: 62.2 % (ref 42.7–76)
NRBC BLD AUTO-RTO: 0 /100 WBC (ref 0–0.2)
PLATELET # BLD AUTO: 326 10*3/MM3 (ref 140–450)
POTASSIUM SERPL-SCNC: 4.5 MMOL/L (ref 3.5–5.2)
PROT SERPL-MCNC: 6.9 G/DL (ref 6–8.5)
RBC # BLD AUTO: 4.59 10*6/MM3 (ref 3.77–5.28)
SODIUM SERPL-SCNC: 139 MMOL/L (ref 136–145)
WBC # BLD AUTO: 7.1 10*3/MM3 (ref 3.4–10.8)

## 2022-10-28 DIAGNOSIS — J45.20 MILD INTERMITTENT ASTHMA, UNSPECIFIED WHETHER COMPLICATED: ICD-10-CM

## 2022-12-01 ENCOUNTER — OFFICE VISIT (OUTPATIENT)
Dept: FAMILY MEDICINE CLINIC | Facility: CLINIC | Age: 27
End: 2022-12-01

## 2022-12-01 VITALS
TEMPERATURE: 97 F | BODY MASS INDEX: 25.49 KG/M2 | SYSTOLIC BLOOD PRESSURE: 106 MMHG | WEIGHT: 153 LBS | HEIGHT: 65 IN | OXYGEN SATURATION: 99 % | DIASTOLIC BLOOD PRESSURE: 72 MMHG | HEART RATE: 58 BPM

## 2022-12-01 DIAGNOSIS — Z11.3 ROUTINE SCREENING FOR STI (SEXUALLY TRANSMITTED INFECTION): ICD-10-CM

## 2022-12-01 DIAGNOSIS — N39.0 URINARY TRACT INFECTION WITHOUT HEMATURIA, SITE UNSPECIFIED: Primary | ICD-10-CM

## 2022-12-01 DIAGNOSIS — R30.0 DYSURIA: ICD-10-CM

## 2022-12-01 LAB
BILIRUB BLD-MCNC: NEGATIVE MG/DL
CLARITY, POC: CLEAR
COLOR UR: ABNORMAL
EXPIRATION DATE: ABNORMAL
GLUCOSE UR STRIP-MCNC: NEGATIVE MG/DL
KETONES UR QL: NEGATIVE
LEUKOCYTE EST, POC: ABNORMAL
Lab: ABNORMAL
NITRITE UR-MCNC: POSITIVE MG/ML
PH UR: 6 [PH] (ref 5–8)
PROT UR STRIP-MCNC: NEGATIVE MG/DL
RBC # UR STRIP: ABNORMAL /UL
SP GR UR: 1.02 (ref 1–1.03)
UROBILINOGEN UR QL: NORMAL

## 2022-12-01 PROCEDURE — 99213 OFFICE O/P EST LOW 20 MIN: CPT | Performed by: NURSE PRACTITIONER

## 2022-12-01 PROCEDURE — 81003 URINALYSIS AUTO W/O SCOPE: CPT | Performed by: NURSE PRACTITIONER

## 2022-12-01 RX ORDER — NITROFURANTOIN 25; 75 MG/1; MG/1
100 CAPSULE ORAL 2 TIMES DAILY
Qty: 14 CAPSULE | Refills: 0 | Status: SHIPPED | OUTPATIENT
Start: 2022-12-01 | End: 2022-12-08

## 2022-12-02 LAB
A VAGINAE DNA VAG QL NAA+PROBE: NORMAL SCORE
BVAB2 DNA VAG QL NAA+PROBE: NORMAL
C ALBICANS DNA VAG QL NAA+PROBE: NORMAL
C GLABRATA DNA VAG QL NAA+PROBE: NORMAL
C TRACH DNA VAG QL NAA+PROBE: NORMAL
MEGA1 DNA VAG QL NAA+PROBE: NORMAL
N GONORRHOEA DNA VAG QL NAA+PROBE: NORMAL
SPECIMEN STATUS: NORMAL
T VAGINALIS DNA VAG QL NAA+PROBE: NORMAL

## 2022-12-05 LAB
BACTERIA UR CULT: ABNORMAL
BACTERIA UR CULT: ABNORMAL
OTHER ANTIBIOTIC SUSC ISLT: ABNORMAL

## 2022-12-06 LAB
A VAGINAE DNA VAG QL NAA+PROBE: ABNORMAL SCORE
BVAB2 DNA VAG QL NAA+PROBE: ABNORMAL SCORE
C ALBICANS DNA VAG QL NAA+PROBE: POSITIVE
C GLABRATA DNA VAG QL NAA+PROBE: NEGATIVE
C TRACH DNA VAG QL NAA+PROBE: NEGATIVE
MEGA1 DNA VAG QL NAA+PROBE: ABNORMAL SCORE
N GONORRHOEA DNA VAG QL NAA+PROBE: NEGATIVE
T VAGINALIS DNA VAG QL NAA+PROBE: NEGATIVE

## 2022-12-07 ENCOUNTER — TELEPHONE (OUTPATIENT)
Dept: FAMILY MEDICINE CLINIC | Facility: CLINIC | Age: 27
End: 2022-12-07

## 2022-12-07 DIAGNOSIS — B37.9 CANDIDA ALBICANS INFECTION: Primary | ICD-10-CM

## 2022-12-07 RX ORDER — FLUCONAZOLE 150 MG/1
150 TABLET ORAL ONCE
Qty: 2 TABLET | Refills: 0 | Status: SHIPPED | OUTPATIENT
Start: 2022-12-07 | End: 2022-12-07

## 2022-12-07 NOTE — TELEPHONE ENCOUNTER
----- Message from JUDY Osborn sent at 12/7/2022  1:51 PM EST -----  Regarding: Vaginal swab  Please let patient know vaginal swab was negative for trichomonas, chlamydia, gonorrhea, but positive for yeast.  I am going to send her in an oral antifungal medication.

## 2023-06-01 ENCOUNTER — PATIENT MESSAGE (OUTPATIENT)
Dept: FAMILY MEDICINE CLINIC | Facility: CLINIC | Age: 28
End: 2023-06-01

## 2023-06-02 ENCOUNTER — OFFICE VISIT (OUTPATIENT)
Dept: FAMILY MEDICINE CLINIC | Facility: CLINIC | Age: 28
End: 2023-06-02

## 2023-06-02 VITALS
DIASTOLIC BLOOD PRESSURE: 82 MMHG | HEART RATE: 70 BPM | HEIGHT: 65 IN | SYSTOLIC BLOOD PRESSURE: 120 MMHG | RESPIRATION RATE: 16 BRPM | BODY MASS INDEX: 26.73 KG/M2 | TEMPERATURE: 98 F | OXYGEN SATURATION: 99 % | WEIGHT: 160.4 LBS

## 2023-06-02 DIAGNOSIS — N94.9 VAGINAL DISCOMFORT: Primary | ICD-10-CM

## 2023-06-02 LAB
BILIRUB BLD-MCNC: NEGATIVE MG/DL
CLARITY, POC: CLEAR
COLOR UR: YELLOW
EXPIRATION DATE: ABNORMAL
GLUCOSE UR STRIP-MCNC: NEGATIVE MG/DL
KETONES UR QL: NEGATIVE
LEUKOCYTE EST, POC: ABNORMAL
Lab: ABNORMAL
NITRITE UR-MCNC: NEGATIVE MG/ML
PH UR: 7 [PH] (ref 5–8)
PROT UR STRIP-MCNC: NEGATIVE MG/DL
RBC # UR STRIP: ABNORMAL /UL
SP GR UR: 1.01 (ref 1–1.03)
UROBILINOGEN UR QL: ABNORMAL

## 2023-06-02 PROCEDURE — 99213 OFFICE O/P EST LOW 20 MIN: CPT | Performed by: NURSE PRACTITIONER

## 2023-06-02 PROCEDURE — 81003 URINALYSIS AUTO W/O SCOPE: CPT | Performed by: NURSE PRACTITIONER

## 2023-06-02 RX ORDER — FLUCONAZOLE 150 MG/1
150 TABLET ORAL ONCE
Qty: 2 TABLET | Refills: 0 | Status: SHIPPED | OUTPATIENT
Start: 2023-06-02 | End: 2023-06-02

## 2023-06-02 NOTE — PROGRESS NOTES
"Chief Complaint  Vaginitis (Possible Hurts to shower, hurts wearing tight jeans, hurts wipe also had some itching has stopped. Since Monday. )    Subjective        Jeanne Wagner presents to [facility]  History of Present Illness  Jeanne Wagner is a 28 y.o. female who presents to clinic today with concerns of yeast infection.  Patient's symptoms started 3 to 4 days ago.  Patient notices discomfort when she wipes after using the restroom.  She also notices \"stinging\" in the vaginal area after showers.  She denies abnormal vaginal discharge.  She denies urinary frequency, urinary urgency, dysuria, hematuria, fever, chills, or flank pain.  She did have some vaginal itching at the start of her symptoms, but not as much currently.  Patient did have an  last week.    Review of Systems   Constitutional: Negative for chills, fatigue and fever.   Genitourinary: Negative for difficulty urinating, dysuria, flank pain, frequency, hematuria and urgency.        Vaginal discomfort      Objective   Vital Signs:  /82   Pulse 70   Temp 98 °F (36.7 °C)   Resp 16   Ht 165.1 cm (65\")   Wt 72.8 kg (160 lb 6.4 oz)   SpO2 99%   BMI 26.69 kg/m²   Estimated body mass index is 26.69 kg/m² as calculated from the following:    Height as of this encounter: 165.1 cm (65\").    Weight as of this encounter: 72.8 kg (160 lb 6.4 oz).             Physical Exam  Vitals reviewed.   Constitutional:       General: She is not in acute distress.     Appearance: Normal appearance. She is not ill-appearing, toxic-appearing or diaphoretic.   HENT:      Head: Normocephalic and atraumatic.   Pulmonary:      Effort: No respiratory distress.   Abdominal:      Tenderness: There is no abdominal tenderness. There is no right CVA tenderness or left CVA tenderness.   Neurological:      General: No focal deficit present.      Mental Status: She is alert and oriented to person, place, and time.      Motor: No " weakness.      Gait: Gait normal.   Psychiatric:         Mood and Affect: Mood normal.         Behavior: Behavior normal.        Result Review :         Brief Urine Lab Results  (Last result in the past 365 days)      Color   Clarity   Blood   Leuk Est   Nitrite   Protein   CREAT   Urine HCG        06/02/23 1103 Yellow   Clear   Trace   Small (1+)   Negative   Negative                           Assessment and Plan   Diagnoses and all orders for this visit:    1. Vaginal discomfort (Primary)  -     POC Urinalysis Dipstick, Automated  -     Cancel: Urine Culture - Urine, Urine, Clean Catch  -     Cancel: NuSwab VG+ - Swab, Vagina  -     fluconazole (DIFLUCAN) 150 MG tablet; Take 1 tablet by mouth 1 (One) Time for 1 dose. Take second tab 3 days later if symptoms persist  Dispense: 2 tablet; Refill: 0  -     NuSwab VG+ - Swab, Vagina  -     Urine Culture - Urine, Urine, Clean Catch      Will treat patient with fluconazole as she tolerated this well last time with her episode of yeast infection.  Urinalysis does reveal trace blood and small leukocytes.  Will send urine for culture.  Patient does not have urinary symptoms today, so we will hold off starting on an antibiotic until urine culture returns.  Patient is aware when to seek care over the weekend.  We will call patient with nuswab and urine culture results.       Follow Up   Return if symptoms worsen or fail to improve.  Patient was given instructions and counseling regarding her condition or for health maintenance advice. Please see specific information pulled into the AVS if appropriate.       Answers for HPI/ROS submitted by the patient on 6/2/2023  Please describe your symptoms.: I have pain when I wipe my vaginia.  Have you had these symptoms before?: Yes  How long have you been having these symptoms?: 1-4 days  Please list any medications you are currently taking for this condition.: N/A  Please describe any probable cause for these symptoms. : N/A  What is  the primary reason for your visit?: Other    Electronically signed by JUDY Baum, 06/02/23, 11:18 AM EDT.

## 2023-06-02 NOTE — TELEPHONE ENCOUNTER
From: Jeanne Wagner  To: Va Gentile  Sent: 6/1/2023 11:17 PM EDT  Subject: RX Question    Va Gentile,  I believe I have another yeast infection. Is there any way I can request a prescription to treat this?

## 2023-06-08 LAB
A VAGINAE DNA VAG QL NAA+PROBE: ABNORMAL SCORE
BACTERIA UR CULT: ABNORMAL
BACTERIA UR CULT: ABNORMAL
BVAB2 DNA VAG QL NAA+PROBE: ABNORMAL SCORE
C ALBICANS DNA VAG QL NAA+PROBE: POSITIVE
C GLABRATA DNA VAG QL NAA+PROBE: NEGATIVE
C TRACH DNA VAG QL NAA+PROBE: NEGATIVE
MEGA1 DNA VAG QL NAA+PROBE: ABNORMAL SCORE
N GONORRHOEA DNA VAG QL NAA+PROBE: NEGATIVE
T VAGINALIS DNA VAG QL NAA+PROBE: ABNORMAL

## 2024-01-26 ENCOUNTER — OFFICE VISIT (OUTPATIENT)
Dept: FAMILY MEDICINE CLINIC | Facility: CLINIC | Age: 29
End: 2024-01-26
Payer: COMMERCIAL

## 2024-01-26 VITALS
WEIGHT: 150 LBS | OXYGEN SATURATION: 100 % | BODY MASS INDEX: 24.99 KG/M2 | TEMPERATURE: 97.8 F | DIASTOLIC BLOOD PRESSURE: 68 MMHG | HEART RATE: 78 BPM | HEIGHT: 65 IN | SYSTOLIC BLOOD PRESSURE: 106 MMHG

## 2024-01-26 DIAGNOSIS — Z13.29 SCREENING FOR THYROID DISORDER: ICD-10-CM

## 2024-01-26 DIAGNOSIS — Z13.0 SCREENING, ANEMIA, DEFICIENCY, IRON: ICD-10-CM

## 2024-01-26 DIAGNOSIS — Z00.00 HEALTH MAINTENANCE EXAMINATION: Primary | ICD-10-CM

## 2024-01-26 DIAGNOSIS — Z13.220 SCREENING FOR LIPID DISORDERS: ICD-10-CM

## 2024-01-26 DIAGNOSIS — Z13.1 SCREENING FOR DIABETES MELLITUS: ICD-10-CM

## 2024-01-26 DIAGNOSIS — Z11.59 ENCOUNTER FOR HEPATITIS C SCREENING TEST FOR LOW RISK PATIENT: ICD-10-CM

## 2024-01-26 PROCEDURE — 99395 PREV VISIT EST AGE 18-39: CPT | Performed by: FAMILY MEDICINE

## 2024-01-26 RX ORDER — FEXOFENADINE HCL 180 MG/1
180 TABLET ORAL DAILY
COMMUNITY

## 2024-01-26 RX ORDER — FLUTICASONE PROPIONATE 50 MCG
1 SPRAY, SUSPENSION (ML) NASAL DAILY
COMMUNITY

## 2024-01-26 NOTE — PROGRESS NOTES
"Chief Complaint  Annual Exam (No complains  doing well )    Subjective        Jeanne Wagner presents to [facility]  History of Present Illness  Annual Exam.    Health Maintenance   Topic Date Due    Pneumococcal Vaccine 0-64 (1 of 2 - PCV) Never done    HEPATITIS C SCREENING  Never done    ANNUAL PHYSICAL  Never done    COVID-19 Vaccine (4 - 2023-24 season) 09/01/2023    INFLUENZA VACCINE  03/31/2024 (Originally 8/1/2023)    PAP SMEAR  01/10/2027    TDAP/TD VACCINES (2 - Tdap) 01/05/2028     Diet: eating veggies, not drinking   Exercise: doing great, doing 2 a days. She is working to get down to a healthy weight   GYN: UTD 1/10/34  Immunizations: discussed   Colon cancer screening: not due for colonoscopy   Sleep/mental health: doing well  Dentist: needs apt   Vision: no concerns     Objective   Vital Signs:  /68   Pulse 78   Temp 97.8 °F (36.6 °C)   Ht 165.1 cm (65\")   Wt 68 kg (150 lb)   SpO2 100%   BMI 24.96 kg/m²   Estimated body mass index is 24.96 kg/m² as calculated from the following:    Height as of this encounter: 165.1 cm (65\").    Weight as of this encounter: 68 kg (150 lb).       BMI is within normal parameters. No other follow-up for BMI required.      Physical Exam  Vitals and nursing note reviewed.   Constitutional:       General: She is not in acute distress.     Appearance: She is well-developed.   HENT:      Head: Normocephalic.      Nose: Nose normal.   Cardiovascular:      Rate and Rhythm: Normal rate and regular rhythm.      Heart sounds: Normal heart sounds. No murmur heard.  Pulmonary:      Effort: Pulmonary effort is normal. No respiratory distress.      Breath sounds: Normal breath sounds.   Musculoskeletal:         General: Normal range of motion.   Skin:     General: Skin is warm and dry.      Findings: No rash.   Neurological:      Mental Status: She is alert and oriented to person, place, and time.   Psychiatric:         Behavior: Behavior " normal.         Thought Content: Thought content normal.         Judgment: Judgment normal.        Result Review :                     Assessment and Plan     Diagnoses and all orders for this visit:    1. Health maintenance examination (Primary)    2. Screening for diabetes mellitus  -     Comprehensive Metabolic Panel    3. Screening for lipid disorders  -     Lipid Panel    4. Screening for thyroid disorder  -     TSH Rfx On Abnormal To Free T4    5. Screening, anemia, deficiency, iron  -     CBC & Differential    6. Encounter for hepatitis C screening test for low risk patient  -     Hepatitis C Antibody    Here for annual exam, fasting labs ordered as above, immunizations up-to-date, colon cancer screening Due at 43, GYN health UTD, cardiovascular screening UTD, counseled patient to increase vegetable intake, water and 150 minutes of exercise weekly combining weightbearing exercises and aerobic activity.         Follow Up     Return in about 1 year (around 1/26/2025), or if symptoms worsen or fail to improve, for Annual physical.  Patient was given instructions and counseling regarding her condition or for health maintenance advice. Please see specific information pulled into the AVS if appropriate.

## 2024-01-27 LAB
ALBUMIN SERPL-MCNC: 5 G/DL (ref 3.5–5.2)
ALBUMIN/GLOB SERPL: 2.4 G/DL
ALP SERPL-CCNC: 47 U/L (ref 39–117)
ALT SERPL-CCNC: 17 U/L (ref 1–33)
AST SERPL-CCNC: 14 U/L (ref 1–32)
BASOPHILS # BLD AUTO: 0.02 10*3/MM3 (ref 0–0.2)
BASOPHILS NFR BLD AUTO: 0.3 % (ref 0–1.5)
BILIRUB SERPL-MCNC: 0.7 MG/DL (ref 0–1.2)
BUN SERPL-MCNC: 7 MG/DL (ref 6–20)
BUN/CREAT SERPL: 10 (ref 7–25)
CALCIUM SERPL-MCNC: 10 MG/DL (ref 8.6–10.5)
CHLORIDE SERPL-SCNC: 101 MMOL/L (ref 98–107)
CHOLEST SERPL-MCNC: 197 MG/DL (ref 0–200)
CO2 SERPL-SCNC: 24.8 MMOL/L (ref 22–29)
CREAT SERPL-MCNC: 0.7 MG/DL (ref 0.57–1)
EGFRCR SERPLBLD CKD-EPI 2021: 119.5 ML/MIN/1.73
EOSINOPHIL # BLD AUTO: 0.09 10*3/MM3 (ref 0–0.4)
EOSINOPHIL NFR BLD AUTO: 1.3 % (ref 0.3–6.2)
ERYTHROCYTE [DISTWIDTH] IN BLOOD BY AUTOMATED COUNT: 12.4 % (ref 12.3–15.4)
GLOBULIN SER CALC-MCNC: 2.1 GM/DL
GLUCOSE SERPL-MCNC: 81 MG/DL (ref 65–99)
HCT VFR BLD AUTO: 40.5 % (ref 34–46.6)
HCV IGG SERPL QL IA: NON REACTIVE
HDLC SERPL-MCNC: 69 MG/DL (ref 40–60)
HGB BLD-MCNC: 13.5 G/DL (ref 12–15.9)
IMM GRANULOCYTES # BLD AUTO: 0.01 10*3/MM3 (ref 0–0.05)
IMM GRANULOCYTES NFR BLD AUTO: 0.1 % (ref 0–0.5)
LDLC SERPL CALC-MCNC: 119 MG/DL (ref 0–100)
LYMPHOCYTES # BLD AUTO: 2.43 10*3/MM3 (ref 0.7–3.1)
LYMPHOCYTES NFR BLD AUTO: 35.2 % (ref 19.6–45.3)
MCH RBC QN AUTO: 30.4 PG (ref 26.6–33)
MCHC RBC AUTO-ENTMCNC: 33.3 G/DL (ref 31.5–35.7)
MCV RBC AUTO: 91.2 FL (ref 79–97)
MONOCYTES # BLD AUTO: 0.56 10*3/MM3 (ref 0.1–0.9)
MONOCYTES NFR BLD AUTO: 8.1 % (ref 5–12)
NEUTROPHILS # BLD AUTO: 3.8 10*3/MM3 (ref 1.7–7)
NEUTROPHILS NFR BLD AUTO: 55 % (ref 42.7–76)
NRBC BLD AUTO-RTO: 0 /100 WBC (ref 0–0.2)
PLATELET # BLD AUTO: 285 10*3/MM3 (ref 140–450)
POTASSIUM SERPL-SCNC: 4.4 MMOL/L (ref 3.5–5.2)
PROT SERPL-MCNC: 7.1 G/DL (ref 6–8.5)
RBC # BLD AUTO: 4.44 10*6/MM3 (ref 3.77–5.28)
SODIUM SERPL-SCNC: 138 MMOL/L (ref 136–145)
TRIGL SERPL-MCNC: 46 MG/DL (ref 0–150)
TSH SERPL DL<=0.005 MIU/L-ACNC: 2.31 UIU/ML (ref 0.27–4.2)
VLDLC SERPL CALC-MCNC: 9 MG/DL (ref 5–40)
WBC # BLD AUTO: 6.91 10*3/MM3 (ref 3.4–10.8)

## 2024-01-29 NOTE — PROGRESS NOTES
Please call patient back with results.  The labs has resulted as overall normal aside from mildly elevated cholesterol but even this is quite mild.  Continue all your healthy habits and see you next year!  Please let us know if you have further questions.     Thank you

## 2024-03-02 DIAGNOSIS — J45.20 MILD INTERMITTENT ASTHMA, UNSPECIFIED WHETHER COMPLICATED: ICD-10-CM

## 2024-03-04 RX ORDER — ALBUTEROL SULFATE 90 UG/1
2 AEROSOL, METERED RESPIRATORY (INHALATION) 4 TIMES DAILY
Qty: 8 G | Refills: 1 | Status: SHIPPED | OUTPATIENT
Start: 2024-03-04

## 2024-04-05 ENCOUNTER — OFFICE VISIT (OUTPATIENT)
Dept: FAMILY MEDICINE CLINIC | Facility: CLINIC | Age: 29
End: 2024-04-05
Payer: COMMERCIAL

## 2024-04-05 VITALS
BODY MASS INDEX: 22.82 KG/M2 | DIASTOLIC BLOOD PRESSURE: 70 MMHG | HEIGHT: 65 IN | HEART RATE: 78 BPM | SYSTOLIC BLOOD PRESSURE: 122 MMHG | OXYGEN SATURATION: 98 % | WEIGHT: 137 LBS | TEMPERATURE: 97.8 F

## 2024-04-05 DIAGNOSIS — R42 VERTIGO: Primary | ICD-10-CM

## 2024-04-05 PROCEDURE — 99213 OFFICE O/P EST LOW 20 MIN: CPT | Performed by: FAMILY MEDICINE

## 2024-04-05 RX ORDER — METHYLPREDNISOLONE 4 MG/1
TABLET ORAL
Qty: 21 EACH | Refills: 0 | Status: SHIPPED | OUTPATIENT
Start: 2024-04-05

## 2024-04-05 RX ORDER — FLUTICASONE PROPIONATE 50 MCG
1 SPRAY, SUSPENSION (ML) NASAL DAILY
Qty: 18.2 ML | Refills: 2 | Status: SHIPPED | OUTPATIENT
Start: 2024-04-05

## 2024-04-05 NOTE — PROGRESS NOTES
"Chief Complaint  Dizziness (Since Monday had episode of dizziness   still feel some not as bad last time had same  problem was an ear infection  )    Subjective        Jeanne Wagner presents to [facility]  History of Present Illness  Pleasant 29-year-old female here for dizziness that started on Monday, dizziness with looking to the left over the shoulder or when she bent down to get items out of the washer.  She feels motion sick, had one episode like this before when she had an ear infection. No congestion or nasal congestion, one episodes of nausea.      Objective   Vital Signs:  /70   Pulse 78   Temp 97.8 °F (36.6 °C)   Ht 165.1 cm (65\")   Wt 62.1 kg (137 lb)   SpO2 98%   BMI 22.80 kg/m²   Estimated body mass index is 22.8 kg/m² as calculated from the following:    Height as of this encounter: 165.1 cm (65\").    Weight as of this encounter: 62.1 kg (137 lb).       BMI is within normal parameters. No other follow-up for BMI required.      Physical Exam  Vitals and nursing note reviewed.   Constitutional:       General: She is not in acute distress.     Appearance: She is well-developed.   HENT:      Head: Normocephalic.      Ears:      Comments: Fluid behind the right ear but no evidence of infection     Nose: Nose normal.   Cardiovascular:      Rate and Rhythm: Normal rate and regular rhythm.      Heart sounds: Normal heart sounds. No murmur heard.  Pulmonary:      Effort: Pulmonary effort is normal. No respiratory distress.      Breath sounds: Normal breath sounds.   Musculoskeletal:         General: Normal range of motion.   Skin:     General: Skin is warm and dry.      Findings: No rash.   Neurological:      Mental Status: She is alert and oriented to person, place, and time.      Comments: Epley maneuver performed, symptoms reproduced with head to the left, slight nystagmus and reproduction of symptoms.   Psychiatric:         Behavior: Behavior normal.         " Thought Content: Thought content normal.         Judgment: Judgment normal.        Result Review :                     Assessment and Plan     Diagnoses and all orders for this visit:    1. Vertigo (Primary)  -     methylPREDNISolone (MEDROL) 4 MG dose pack; Take as directed on package instructions.  Dispense: 21 each; Refill: 0  -     fluticasone (FLONASE) 50 MCG/ACT nasal spray; 1 spray into the nostril(s) as directed by provider Daily.  Dispense: 18.2 mL; Refill: 2    Pleasant 29-year-old female here for vertigo symptoms that are mild.  Epley maneuver performed today which showed improvement, she does have fluid behind the right ear.  Plan to treat with prednisone as above and Flonase after.  Epley maneuver performed today and handout given to do at home if her symptoms do not resolve.  If her symptoms or not improving she will let me know so we can refer her to ENT       Follow Up     Return if symptoms worsen or fail to improve.  Patient was given instructions and counseling regarding her condition or for health maintenance advice. Please see specific information pulled into the AVS if appropriate.

## 2024-09-18 ENCOUNTER — OFFICE VISIT (OUTPATIENT)
Dept: FAMILY MEDICINE CLINIC | Facility: CLINIC | Age: 29
End: 2024-09-18
Payer: COMMERCIAL

## 2024-09-18 VITALS
WEIGHT: 140 LBS | HEART RATE: 93 BPM | SYSTOLIC BLOOD PRESSURE: 116 MMHG | OXYGEN SATURATION: 98 % | BODY MASS INDEX: 23.32 KG/M2 | DIASTOLIC BLOOD PRESSURE: 76 MMHG | HEIGHT: 65 IN | TEMPERATURE: 98 F

## 2024-09-18 DIAGNOSIS — R39.89 SUSPECTED UTI: Primary | ICD-10-CM

## 2024-09-18 DIAGNOSIS — J02.9 SORE THROAT: ICD-10-CM

## 2024-09-18 LAB
BILIRUB BLD-MCNC: NEGATIVE MG/DL
CLARITY, POC: CLEAR
COLOR UR: YELLOW
EXPIRATION DATE: ABNORMAL
EXPIRATION DATE: NORMAL
EXPIRATION DATE: NORMAL
GLUCOSE UR STRIP-MCNC: NEGATIVE MG/DL
INTERNAL CONTROL: NORMAL
INTERNAL CONTROL: NORMAL
KETONES UR QL: NEGATIVE
LEUKOCYTE EST, POC: ABNORMAL
Lab: ABNORMAL
Lab: NORMAL
Lab: NORMAL
NITRITE UR-MCNC: NEGATIVE MG/ML
PH UR: 7 [PH] (ref 5–8)
PROT UR STRIP-MCNC: NEGATIVE MG/DL
RBC # UR STRIP: ABNORMAL /UL
S PYO RRNA THROAT QL PROBE: NEGATIVE
SARS-COV-2 AG UPPER RESP QL IA.RAPID: NORMAL
SP GR UR: 1.01 (ref 1–1.03)
UROBILINOGEN UR QL: NORMAL

## 2024-09-18 PROCEDURE — 99213 OFFICE O/P EST LOW 20 MIN: CPT | Performed by: NURSE PRACTITIONER

## 2024-09-18 PROCEDURE — 87426 SARSCOV CORONAVIRUS AG IA: CPT | Performed by: NURSE PRACTITIONER

## 2024-09-18 PROCEDURE — 87651 STREP A DNA AMP PROBE: CPT | Performed by: NURSE PRACTITIONER

## 2024-09-18 PROCEDURE — 81003 URINALYSIS AUTO W/O SCOPE: CPT | Performed by: NURSE PRACTITIONER

## 2024-09-20 LAB
BACTERIA UR CULT: NORMAL
BACTERIA UR CULT: NORMAL

## 2025-01-14 ENCOUNTER — TELEPHONE (OUTPATIENT)
Dept: FAMILY MEDICINE CLINIC | Facility: CLINIC | Age: 30
End: 2025-01-14
Payer: COMMERCIAL

## 2025-01-14 NOTE — TELEPHONE ENCOUNTER
Caller: Jeanne Wagner    Relationship to patient: Self    Best call back number: [phone]    Chief complaint: RIGHT ELBOW PAIN, NAUSEA    Patient directed to call 911 or go to their nearest emergency room.     Patient verbalized understanding: [x] Yes  [] No  If no, why?    Additional notes:  PATIENT FELL ON THE ICE AND HURT HER ELBOW, AND NOW IS NAUSEOUS EVERY TIME HER ELBOW IS TOUCHED.

## 2025-01-15 ENCOUNTER — PATIENT ROUNDING (BHMG ONLY) (OUTPATIENT)
Dept: URGENT CARE | Facility: CLINIC | Age: 30
End: 2025-01-15
Payer: COMMERCIAL

## 2025-01-15 NOTE — ED NOTES
Thank you for letting us care for you during your recent visit to [facility]. We would love to hear about your experience with us. Was this the first time you have visited our location?    We’re always looking for ways to make our patients’ experiences even better. Do you have any recommendations on ways we may improve?    I appreciate you taking the time to respond. Please be on the lookout for a survey about your recent visit from [employer] via text or email. We would greatly appreciate if you could fill that out and turn it back in. We want your voice to be heard and we value your feedback.     Thank you for choosing [facility] for your healthcare needs.

## 2025-01-16 ENCOUNTER — OFFICE VISIT (OUTPATIENT)
Dept: ORTHOPEDIC SURGERY | Facility: CLINIC | Age: 30
End: 2025-01-16
Payer: COMMERCIAL

## 2025-01-16 VITALS — BODY MASS INDEX: 23.82 KG/M2 | TEMPERATURE: 99.5 F | HEIGHT: 65 IN | WEIGHT: 143 LBS
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
## 2025-01-16 DIAGNOSIS — S40.021A ARM CONTUSION, RIGHT, INITIAL ENCOUNTER: ICD-10-CM

## 2025-01-16 RX ORDER — NAPROXEN SODIUM 220 MG/1
220 TABLET, FILM COATED ORAL 2 TIMES DAILY PRN
COMMUNITY

## 2025-01-16 RX ORDER — CLINDAMYCIN PHOSPHATE AND BENZOYL PEROXIDE 10; 50 MG/G; MG/G
GEL TOPICAL
COMMUNITY
Start: 2023-05-03

## 2025-01-16 NOTE — PROGRESS NOTES
Patient Name: Jeanne Wagner   YOB: 1995  Referring Primary Care Physician: Giana Perkins MD  BMI: Body mass index is 23.8 kg/m².    Chief Complaint:    Chief Complaint   Patient presents with    Right Upper Arm - Pain        HPI: Pt slipped Sunday on ice and fell on her right arm and buttocks.  Presents in a sling and has pain above her elbow was seen in the emergency urgent care  Right-hand-dominant  Jeanne Wagner is a 29 y.o. female who presents today for evaluation of   Chief Complaint   Patient presents with    Right Upper Arm - Pain         Subjective   Medications:   Home Medications:  Current Outpatient Medications on File Prior to Visit   Medication Sig    albuterol sulfate HFA (ProAir HFA) 108 (90 Base) MCG/ACT inhaler Inhale 2 puffs 4 (Four) Times a Day.    Clindamycin Phos-Benzoyl Perox 1.2-5 % gel     estradiol (ESTRACE) 0.1 MG/GM vaginal cream     fexofenadine (ALLEGRA) 180 MG tablet Take 1 tablet by mouth Daily.    fluticasone (FLONASE) 50 MCG/ACT nasal spray 1 spray into the nostril(s) as directed by provider Daily.    naproxen sodium (ALEVE) 220 MG tablet Take 1 tablet by mouth 2 (Two) Times a Day As Needed.     No current facility-administered medications on file prior to visit.     Current Medications:  Scheduled Meds:  Continuous Infusions:No current facility-administered medications for this visit.    PRN Meds:.    I have reviewed the patient's medical history in detail and updated the computerized patient record.  Review and summarization of old records includes:    Past Medical History:   Diagnosis Date    Asthma     exercise induced    Fibromyalgia     Fibromyalgia, primary     Headache 2015        Past Surgical History:   Procedure Laterality Date    EAR TUBES      HAND SURGERY Right     3rd finger, tumor removed    KNEE ARTHROSCOPY Left     WISDOM TOOTH EXTRACTION          Social History     Occupational History    Not on file   Tobacco Use    Smoking status: Never     "Smokeless tobacco: Never   Vaping Use    Vaping status: Never Used   Substance and Sexual Activity    Alcohol use: Yes     Comment: \"socially\"    Drug use: No    Sexual activity: Yes     Partners: Male     Birth control/protection: Condom     Comment: last enounter 3 years ago      Social History     Social History Narrative    Not on file        Family History   Problem Relation Age of Onset    Hypertension Mother     Cancer Mother     Diabetes Father     Hypertension Father     Seizures Sister     Asthma Brother     Thyroid disease Paternal Grandmother     Coronary artery disease Paternal Grandfather         CABG    Stroke Other     Colon cancer Other     Depression Maternal Aunt     Breast cancer Neg Hx        ROS: 14 point review of systems was performed and all other systems were reviewed and are negative except for documented findings in HPI and today's encounter.     Allergies: No Known Allergies  Constitutional:  Denies fever, shaking or chills   Eyes:  Denies change in visual acuity   HENT:  Denies nasal congestion or sore throat   Respiratory:  Denies cough or shortness of breath   Cardiovascular:  Denies chest pain or severe LE edema   GI:  Denies abdominal pain, nausea, vomiting, bloody stools or diarrhea   Musculoskeletal:  Numbness, tingling, pain, or loss of motor function only as noted above in history of present illness.  : Denies painful urination or hematuria  Integument:  Denies rash, lesion or ulceration   Neurologic:  Denies headache or focal weakness  Endocrine:  Denies lymphadenopathy  Psych:  Denies confusion or change in mental status   Hem:  Denies active bleeding    OBJECTIVE:  Physical Exam: 29 y.o. female  Wt Readings from Last 3 Encounters:   01/16/25 64.9 kg (143 lb)   01/14/25 63.5 kg (140 lb)   09/18/24 63.5 kg (140 lb)     Ht Readings from Last 1 Encounters:   01/16/25 165.1 cm (65\")     Body mass index is 23.8 kg/m².  Vitals:    01/16/25 1311   Temp: 99.5 °F (37.5 °C)     Vital " signs reviewed.     General Appearance:    Alert, cooperative, in no acute distress                  Eyes: conjunctiva clear  ENT: external ears and nose atraumatic  CV: no peripheral edema  Resp: normal respiratory effort  Skin: no rashes or wounds; normal turgor  Psych: mood and affect appropriate  Lymph: no nodes appreciated  Neuro: gross sensation intact  Vascular:  Palpable peripheral pulse in noted extremity  Musculoskeletal Extremities: skin warm, dry and intact with diffuse tenderness to right upper arm, no ecchymosis, bicep tendon intact, radial head nttp     Radiology:   Study Result    Narrative & Impression   RIGHT ELBOW: 3 VIEWS  RIGHT HUMERUS: 2 VIEWS     HISTORY: Right upper arm pain.     COMPARISON:  None.     FINDINGS:  RIGHT HUMERUS: No fracture or osseous abnormality. The soft tissues  appear normal.     RIGHT ELBOW: No evidence for fracture or dislocation or acute  abnormality of the right elbow. Soft tissues appear normal.     This report was finalized on 1/14/2025 7:35 PM by Cisco Bates M.D  on Workstation: [id number]          Assessment:     ICD-10-CM ICD-9-CM   1. Fall from slip, trip, or stumble, initial encounter  W01.0XXA E885.9   2. Arm contusion, right, initial encounter  S40.021A 923.9        Procedures    Remove sling and RICE   MDM/Plan:   The diagnosis(es), natural history, pathophysiology and treatment for diagnosis(es) were discussed. Opportunity given and questions answered.  Biomechanics of pertinent body areas discussed.  When appropriate, the use of ambulatory aids discussed.  MEDICATIONS:  The risks, benefits, warnings,side effects and alternatives of medications discussed.  Inflammation/pain control; with cold, heat, elevation and/or liniments discussed as appropriate  MEDICAL RECORDS reviewed from other provider(s) for past and current medical history pertinent to this complaint.      1/16/2025    Much of this encounter note is an electronic transcription/translation  of spoken language to printed text. The electronic translation of spoken language may permit erroneous, or at times, nonsensical words or phrases to be inadvertently transcribed; Although I have reviewed the note for such errors, some may still exist

## 2025-01-17 ENCOUNTER — PATIENT ROUNDING (BHMG ONLY) (OUTPATIENT)
Dept: ORTHOPEDIC SURGERY | Facility: CLINIC | Age: 30
End: 2025-01-17
Payer: COMMERCIAL

## 2025-01-17 NOTE — PROGRESS NOTES
January 17, 2025      A [employer] Message has been sent to the patient for PATIENT ROUNDING with [facility]

## 2025-01-31 ENCOUNTER — OFFICE VISIT (OUTPATIENT)
Dept: FAMILY MEDICINE CLINIC | Facility: CLINIC | Age: 30
End: 2025-01-31
Payer: COMMERCIAL

## 2025-01-31 VITALS
HEART RATE: 67 BPM | WEIGHT: 138 LBS | DIASTOLIC BLOOD PRESSURE: 72 MMHG | HEIGHT: 65 IN | SYSTOLIC BLOOD PRESSURE: 118 MMHG | TEMPERATURE: 97.8 F | BODY MASS INDEX: 22.99 KG/M2 | OXYGEN SATURATION: 98 %

## 2025-01-31 DIAGNOSIS — Z13.0 SCREENING, ANEMIA, DEFICIENCY, IRON: ICD-10-CM

## 2025-01-31 DIAGNOSIS — Z13.220 SCREENING FOR LIPID DISORDERS: ICD-10-CM

## 2025-01-31 DIAGNOSIS — Z00.00 HEALTH MAINTENANCE EXAMINATION: Primary | ICD-10-CM

## 2025-01-31 DIAGNOSIS — Z13.29 SCREENING FOR THYROID DISORDER: ICD-10-CM

## 2025-01-31 DIAGNOSIS — L70.0 ACNE VULGARIS: ICD-10-CM

## 2025-01-31 DIAGNOSIS — Z13.1 SCREENING FOR DIABETES MELLITUS: ICD-10-CM

## 2025-01-31 LAB
ALBUMIN SERPL-MCNC: 4.4 G/DL (ref 3.5–5.2)
ALBUMIN/GLOB SERPL: 1.7 G/DL
ALP SERPL-CCNC: 50 U/L (ref 39–117)
ALT SERPL-CCNC: 17 U/L (ref 1–33)
AST SERPL-CCNC: 16 U/L (ref 1–32)
BASOPHILS # BLD AUTO: 0.03 10*3/MM3 (ref 0–0.2)
BASOPHILS NFR BLD AUTO: 0.5 % (ref 0–1.5)
BILIRUB SERPL-MCNC: 0.7 MG/DL (ref 0–1.2)
BUN SERPL-MCNC: 10 MG/DL (ref 6–20)
BUN/CREAT SERPL: 16.4 (ref 7–25)
CALCIUM SERPL-MCNC: 9.5 MG/DL (ref 8.6–10.5)
CHLORIDE SERPL-SCNC: 103 MMOL/L (ref 98–107)
CHOLEST SERPL-MCNC: 203 MG/DL (ref 0–200)
CO2 SERPL-SCNC: 27 MMOL/L (ref 22–29)
CREAT SERPL-MCNC: 0.61 MG/DL (ref 0.57–1)
EGFRCR SERPLBLD CKD-EPI 2021: 122.8 ML/MIN/1.73
EOSINOPHIL # BLD AUTO: 0.23 10*3/MM3 (ref 0–0.4)
EOSINOPHIL NFR BLD AUTO: 3.5 % (ref 0.3–6.2)
ERYTHROCYTE [DISTWIDTH] IN BLOOD BY AUTOMATED COUNT: 11.9 % (ref 12.3–15.4)
GLOBULIN SER CALC-MCNC: 2.6 GM/DL
GLUCOSE SERPL-MCNC: 80 MG/DL (ref 65–99)
HCT VFR BLD AUTO: 40.3 % (ref 34–46.6)
HDLC SERPL-MCNC: 68 MG/DL (ref 40–60)
HGB BLD-MCNC: 13.6 G/DL (ref 12–15.9)
IMM GRANULOCYTES # BLD AUTO: 0.02 10*3/MM3 (ref 0–0.05)
IMM GRANULOCYTES NFR BLD AUTO: 0.3 % (ref 0–0.5)
LDLC SERPL CALC-MCNC: 123 MG/DL (ref 0–100)
LYMPHOCYTES # BLD AUTO: 2.18 10*3/MM3 (ref 0.7–3.1)
LYMPHOCYTES NFR BLD AUTO: 32.8 % (ref 19.6–45.3)
MCH RBC QN AUTO: 30.9 PG (ref 26.6–33)
MCHC RBC AUTO-ENTMCNC: 33.7 G/DL (ref 31.5–35.7)
MCV RBC AUTO: 91.6 FL (ref 79–97)
MONOCYTES # BLD AUTO: 0.46 10*3/MM3 (ref 0.1–0.9)
MONOCYTES NFR BLD AUTO: 6.9 % (ref 5–12)
NEUTROPHILS # BLD AUTO: 3.73 10*3/MM3 (ref 1.7–7)
NEUTROPHILS NFR BLD AUTO: 56 % (ref 42.7–76)
NRBC BLD AUTO-RTO: 0 /100 WBC (ref 0–0.2)
PLATELET # BLD AUTO: 335 10*3/MM3 (ref 140–450)
POTASSIUM SERPL-SCNC: 4.5 MMOL/L (ref 3.5–5.2)
PROT SERPL-MCNC: 7 G/DL (ref 6–8.5)
RBC # BLD AUTO: 4.4 10*6/MM3 (ref 3.77–5.28)
SODIUM SERPL-SCNC: 140 MMOL/L (ref 136–145)
TRIGL SERPL-MCNC: 69 MG/DL (ref 0–150)
TSH SERPL DL<=0.005 MIU/L-ACNC: 2.64 UIU/ML (ref 0.27–4.2)
VLDLC SERPL CALC-MCNC: 12 MG/DL (ref 5–40)
WBC # BLD AUTO: 6.65 10*3/MM3 (ref 3.4–10.8)

## 2025-01-31 PROCEDURE — 99395 PREV VISIT EST AGE 18-39: CPT | Performed by: FAMILY MEDICINE

## 2025-01-31 RX ORDER — CLINDAMYCIN PHOSPHATE AND BENZOYL PEROXIDE 10; 50 MG/G; MG/G
1 GEL TOPICAL DAILY
Qty: 45 G | Refills: 2 | Status: SHIPPED | OUTPATIENT
Start: 2025-01-31

## 2025-01-31 NOTE — PROGRESS NOTES
"Chief Complaint  Annual Exam (Doing well no complains ,fasting  for labs if need it )    Subjective        Jeanne Wagner presents to [facility]  History of Present Illness  Annual Exam.    Health Maintenance   Topic Date Due    Pneumococcal Vaccine 0-64 (1 of 2 - PCV) Never done    ANNUAL PHYSICAL  01/31/2026    PAP SMEAR  01/10/2027    TDAP/TD VACCINES (2 - Tdap) 01/05/2028    HEPATITIS C SCREENING  Completed    COVID-19 Vaccine  Completed    INFLUENZA VACCINE  Completed     Diet: eating well   Exercise: exercising well   GYN: Menses are regular,   Immunizations: UTD   Colon cancer screening: Due at 43  Sleep/mental health: Doing well   Dentist: UTD   Vision: UTD  Derm: doing well some razer burn in her armpit and bikini line and upper thigh    She has had pounding heart beats, sometimes it feels like it catches her breath, other times annoying, does need to take a deep breath and self resolves, not when exercising, sometimes when sitting, or when on her couch sitting down. Or when she is laying down getting ready to fall asleep but last for brief seconds.    She does know she has a nasal deviation that does impact her breathing, she may check in with ENT.    Objective   Vital Signs:  /72   Pulse 67   Temp 97.8 °F (36.6 °C)   Ht 165.1 cm (65\")   Wt 62.6 kg (138 lb)   SpO2 98%   BMI 22.96 kg/m²   Estimated body mass index is 22.96 kg/m² as calculated from the following:    Height as of this encounter: 165.1 cm (65\").    Weight as of this encounter: 62.6 kg (138 lb).    BMI is within normal parameters. No other follow-up for BMI required.      Physical Exam  Vitals and nursing note reviewed.   Constitutional:       General: She is not in acute distress.     Appearance: Normal appearance. She is well-developed.   HENT:      Head: Normocephalic.      Right Ear: Tympanic membrane and ear canal normal. There is no impacted cerumen.      Left Ear: Tympanic membrane and ear " canal normal. There is no impacted cerumen.      Nose: Nose normal.   Eyes:      Conjunctiva/sclera: Conjunctivae normal.      Pupils: Pupils are equal, round, and reactive to light.   Neck:      Vascular: No carotid bruit.   Cardiovascular:      Rate and Rhythm: Normal rate and regular rhythm.      Heart sounds: Normal heart sounds. No murmur heard.  Pulmonary:      Effort: Pulmonary effort is normal. No respiratory distress.      Breath sounds: Normal breath sounds.   Abdominal:      General: Abdomen is flat. Bowel sounds are normal. There is no distension.      Tenderness: There is no abdominal tenderness.   Musculoskeletal:         General: Normal range of motion.   Skin:     General: Skin is warm and dry.      Findings: No rash.   Neurological:      Mental Status: She is alert and oriented to person, place, and time.   Psychiatric:         Behavior: Behavior normal.         Thought Content: Thought content normal.         Judgment: Judgment normal.        Result Review :                Assessment and Plan   Diagnoses and all orders for this visit:    1. Health maintenance examination (Primary)    2. Acne vulgaris  -     Clindamycin Phos-Benzoyl Perox 1.2-5 % gel; Apply 1 ampule topically to the appropriate area as directed Daily.  Dispense: 45 g; Refill: 2    3. Screening for thyroid disorder  -     TSH Rfx On Abnormal To Free T4    4. Screening, anemia, deficiency, iron  -     CBC & Differential    5. Screening for diabetes mellitus  -     Comprehensive Metabolic Panel    6. Screening for lipid disorders  -     Lipid Panel    Here for annual exam, fasting labs ordered as above, immunizations up-to-date, colon cancer screening not indicated until 43, GYN health up-to-date and regular, cardiovascular screening negative for symptoms, counseled patient to increase vegetable intake, water and 150 minutes of exercise weekly combining weightbearing exercises and aerobic activity.    Continue topical treatment for  spot treatment for acne    Discussed getting evaluation at the [facility] clinic for travel going to [place] soon    Okay to call ENT for sinus improvement if desired       Follow Up   Return in 1 year (on 1/31/2026), or if symptoms worsen or fail to improve, for Annual Physical with fasting labs prior.  Patient was given instructions and counseling regarding her condition or for health maintenance advice. Please see specific information pulled into the AVS if appropriate.

## 2025-02-03 NOTE — PROGRESS NOTES
Please verify patient has been notified by [facility], if not please call with results.  The labs has resulted as overall normal, slightly higher cholesterol than last year but otherwise looks great continue with good fiber intake, aiming for 30 g/day.  Please let us know if you have further questions.     Thank you

## 2025-03-18 DIAGNOSIS — J45.20 MILD INTERMITTENT ASTHMA, UNSPECIFIED WHETHER COMPLICATED: ICD-10-CM

## 2025-03-18 RX ORDER — ALBUTEROL SULFATE 90 UG/1
2 INHALANT RESPIRATORY (INHALATION) EVERY 4 HOURS PRN
Qty: 18 G | Refills: 3 | Status: SHIPPED | OUTPATIENT
Start: 2025-03-18

## 2025-03-22 ENCOUNTER — E-VISIT (OUTPATIENT)
Dept: FAMILY MEDICINE CLINIC | Facility: TELEHEALTH | Age: 30
End: 2025-03-22

## 2025-03-22 NOTE — E-VISIT TREATED
Date: 2025 12:32:18  Clinician: Brandy Rebollar  Clinician NPI: [id number]  Patient: Jeanne Wagner  Patient : 1995  Patient Address: [address] [place] [address]  Patient Phone: [phone]  Visit Protocol: Yeast infection  Patient Summary:  Jeanne is a 30 year old ( : 1995 ) female who initiated a visit for a presumed vaginal yeast infection.     Jeanne began noticing vaginal discharge, vaginal burning, vaginal irritation, and vaginal pruritus 1-3 days ago.     Symptom details   Vaginal discharge: There is more than normal amount of white, chunky (like cottage cheese) discharge. The discharge does not have a fishy smell.    Jeanne denies having blisters and open sores. Jeanne also denies feeling feverish.     Jeanne has not tried to treat current symptoms with any medication.   Precipitating events  Jeanne denies having a sexually transmitted infection.   Pertinent medical history  Jeanne has a history of vaginal yeast infections. Jeanne has had one   (1) occurrence in the past year and the current symptoms are similar to previous yeast infections. Fluconazole (Diflucan) was used to treat previous yeast infections. 2 doses of fluconazole (Diflucan) has typically been needed for symptoms to resolve in   the past.  Jeanne denies taking antibiotics in the past 2 weeks. Preferred medication: Pill   Jeanne has not had bacterial vaginosis in the past.   Jeanne does not have diabetes.   Jeanne denies having immunosuppressive conditions (e.g.,   chemotherapy, HIV, organ transplant, long-term use of steroids or other immunosuppressive medications, splenectomy).   Jeanne denies pregnancy and denies breastfeeding.      MEDICATIONS: naproxen sodium oral, albuterol sulfate inhalation, clindamycin-benzoyl peroxide topical, estradiol vaginal, fluconazole oral, amoxicillin-potassium clavulanate oral, prednisone oral, fexofenadine oral, fluticasone propionate nasal,    ondansetron oral, ALLERGIES: NKDA  Clinician Response:  Dear Jeanne,  Based on the information you have provided, you have a vaginal yeast infection which is a common infection of the vagina caused by a fungus. Yeast are a type of fungus.  The most common symptom of a yeast infection is   itching in and around the vagina. Other signs and symptoms include burning, redness of the vulva (the outer part of the female genitals), or a thick, white vaginal discharge that looks like cottage cheese and does not have a bad smell.  Medication   information  I am prescribing:     Fluconazole (Diflucan) 150 mg oral tablet. Take 1 tablet by mouth in a single dose. Repeat dose in 3 days if symptoms are still present. There are no refills with this prescription.   Self care  Steps you can take to prevent symptoms of future vaginal   yeast infection:     Avoid irritants such as scented bath products, tampons, pads, or vaginal sprays and powders    Avoid douching    Wear cotton underwear and if you are comfortable doing so, do not wear underwear to bed    Avoid hot tubs and whirlpool spas     When to seek care  Most women notice improvement in their symptoms within 1-2 days after starting treatment with complete clearing in 5-7 days.  Please make an appointment to be seen in a clinic or urgent care if any of the following occur:     Your symptoms have not improved in 3 days or not resolved in 10 days    You develop new symptoms or your symptoms become worse    If you think you may be at risk for an STI      Diagnosis: Candida vulvovaginitis  Diagnosis ICD: B37.31    Follow up instructions:  ATTENTION: If you have been prescribed medications, your prescriptions will not be sent until you choose your pharmacy. To do so open the link within your notification, or go to [email or web address] and click eVisit in the menu to open your   treatment plan. From there, you can select your pharmacy at the bottom of your after visit summary. You can also go  to [email or web address]   Prescriptions  Prescription: fluconazole (Diflucan) 150 mg oral tablet, take 1 tablet by mouth in a single dose, repeat dose in 3 days if symptoms are still present  Sent To: [facility] [id number] - [phone] - [address] [place] [address]

## 2025-05-03 ENCOUNTER — HOSPITAL ENCOUNTER (EMERGENCY)
Facility: HOSPITAL | Age: 30
Discharge: HOME | End: 2025-05-03
Attending: EMERGENCY MEDICINE
Payer: COMMERCIAL

## 2025-05-03 ENCOUNTER — APPOINTMENT (OUTPATIENT)
Dept: GENERAL RADIOLOGY | Facility: HOSPITAL | Age: 30
End: 2025-05-03
Payer: COMMERCIAL

## 2025-05-03 VITALS
OXYGEN SATURATION: 99 % | BODY MASS INDEX: 22.99 KG/M2 | SYSTOLIC BLOOD PRESSURE: 116 MMHG | HEART RATE: 87 BPM | DIASTOLIC BLOOD PRESSURE: 87 MMHG | HEIGHT: 65 IN | WEIGHT: 138.01 LBS | RESPIRATION RATE: 16 BRPM | TEMPERATURE: 98.5 F

## 2025-05-03 DIAGNOSIS — S61.211A LACERATION OF LEFT INDEX FINGER WITHOUT FOREIGN BODY WITHOUT DAMAGE TO NAIL, INITIAL ENCOUNTER: Primary | ICD-10-CM

## 2025-05-03 PROCEDURE — 90715 TDAP VACCINE 7 YRS/> IM: CPT | Performed by: EMERGENCY MEDICINE

## 2025-05-03 PROCEDURE — 99283 EMERGENCY DEPT VISIT LOW MDM: CPT

## 2025-05-03 PROCEDURE — 73140 X-RAY EXAM OF FINGER(S): CPT

## 2025-05-03 PROCEDURE — 25010000002 TETANUS-DIPHTH-ACELL PERTUSSIS 5-2.5-18.5 LF-MCG/0.5 SUSPENSION PREFILLED SYRINGE: Performed by: EMERGENCY MEDICINE

## 2025-05-03 PROCEDURE — 90471 IMMUNIZATION ADMIN: CPT | Performed by: EMERGENCY MEDICINE

## 2025-05-03 PROCEDURE — 25010000002 LIDOCAINE 1 % SOLUTION: Performed by: PHYSICIAN ASSISTANT

## 2025-05-03 RX ORDER — LIDOCAINE HYDROCHLORIDE AND EPINEPHRINE 10; 10 MG/ML; UG/ML
10 INJECTION, SOLUTION INFILTRATION; PERINEURAL ONCE
Status: DISCONTINUED | OUTPATIENT
Start: 2025-05-03 | End: 2025-05-03

## 2025-05-03 RX ORDER — LIDOCAINE HYDROCHLORIDE 10 MG/ML
10 INJECTION, SOLUTION INFILTRATION; PERINEURAL ONCE
Status: COMPLETED | OUTPATIENT
Start: 2025-05-03 | End: 2025-05-03

## 2025-05-03 RX ADMIN — TETANUS TOXOID, REDUCED DIPHTHERIA TOXOID AND ACELLULAR PERTUSSIS VACCINE, ADSORBED 0.5 ML: 5; 2.5; 8; 8; 2.5 SUSPENSION INTRAMUSCULAR at 13:18

## 2025-05-03 RX ADMIN — LIDOCAINE HYDROCHLORIDE 10 ML: 10 INJECTION, SOLUTION INFILTRATION; PERINEURAL at 15:00

## 2025-05-03 NOTE — ED TRIAGE NOTES
Patient to ed from  with complaint of finger laceration to left index finger with a butter finger.

## 2025-05-03 NOTE — ED PROVIDER NOTES
"SHARED VISIT: This visit was performed by BOTH a physician and an APC. The substantive portion of the medical decision making was performed by this attesting physician who made or approved the management plan and takes responsibility for patient management. All studies in the APC note (if performed) were independently interpreted by me.     The JEFF and I have discussed this patient's history, physical exam, and treatment plan.  I have reviewed the documentation and personally had a face to face interaction with the patient. I affirm the documentation and agree with the treatment and plan.  The attached note describes my personal findings.      I provided a substantive portion of the care of the patient.  I personally performed the physical exam in its entirety, and below are my findings.     Brief history of present illness: 30-year-old female presents for evaluation of laceration to left index finger shortly prior to arrival with kitchen utensil.  Uncertain date of last tetanus immunization.  Minimal discomfort at this time.  No other complaints    Physical exam:    /88   Pulse 112   Temp 98.5 °F (36.9 °C) (Tympanic)   Resp 20   Ht 165.1 cm (65\")   Wt 62.6 kg (138 lb 0.1 oz)   SpO2 99%   BMI 22.97 kg/m²       Physical Exam   Constitutional: No distress.  Nontoxic  HENT:  Head: Normocephalic and atraumatic.   Oropharynx: Mucous membranes are moist.   Eyes: . No scleral icterus.   Neck: Normal range of motion. Neck supple.   Cardiovascular: Pink warm and well perfused throughout.    Pulmonary/Chest: No respiratory distress.    Musculoskeletal: Moves all extremities equally.  1.5 cm laceration radial surface left index finger overlying the PIP joint.  Full range of motion intact.  2 point discrimination intact distal to laceration.  Normal cap refills noted.  Neurological: Alert and oriented.  Speech fluent and easily intelligible  Skin: Skin is pink, warm, and dry.   Psychiatric: Mood and affect normal. "   Nursing note and vitals reviewed.        MDM:  My differential diagnosis of the upper extremity pain or injury includes but is not limited to contusions of the shoulder, forearm, arm, wrist, elbow or hand, dislocations of shoulder, elbow, wrist, digits, nursemaid's elbow (age-appropriate), shoulder sprain, elbow sprain, wrist sprain, digit sprain, shoulder strain, arm strain, forearm strain, elbow strain, wrist strain, hand sprain, digit strain, lacerations of the upper extremity, fractures both closed and open of clavicle, scapula, humerus, radius, ulna, bones of the wrist, hands and digits, cellulitis or abscess, cervical radiculopathy, radial nerve palsy, neurogenic upper extremity pain, angina equivalent, SVT, DVT, arterial occlusion, compartment syndrome.    I do recommend primary wound closure and wound exploration in the ED given the wound location.  Patient agreeable with this plan.  Will obtain an x-ray, then anesthetized the wound before irrigation and closure.  Patient agreeable.  Tetanus immunization will also be updated.    Please note that portions of this were completed with a voice recognition program.       Note Disclaimer: At [facility], we believe that sharing information builds trust and better relationships. You are receiving this note because you are receiving care at [facility] or recently visited. It is possible you will see health information before a provider has talked with you about it. This kind of information can be easy to misunderstand. To help you fully understand what it means for your health, we urge you to discuss this note with your provider.     Hector Driscoll MD  05/03/25 4089

## 2025-05-03 NOTE — ED PROVIDER NOTES
EMERGENCY DEPARTMENT ENCOUNTER    Room Number:  01/01  Date of encounter:  5/3/2025  PCP: Giana Perkins MD  Historian: Patient, mother  Chronic or social conditions impacting care (social determinants of health): None    HPI:  Chief Complaint: Left index finger laceration  A complete HPI/ROS/PMH/PSH/SH/FH are unobtainable due to: Nothing    Context: Jeanne Wagner is a 30 y.o. female with a history of asthma, who presents to the ED c/o acute laceration to the left index finger prior to arrival.  Patient reports that she cut her finger with a knife at home.  She has a laceration over the PIP joint of her left index finger  She denies any numbness or tingling distally.  No difficulty with movement.  Unknown last tetanus shot.      Review of prior external notes (non-ED):   Reviewed primary care office visit dated 1/31/2025.  Patient seen for routine medical care.    Review of prior external test results outside of this encounter:  I have reviewed a CMP dated 1/31/2025.  Creatinine 0.61, potassium 4.5    PAST MEDICAL HISTORY  Active Ambulatory Problems     Diagnosis Date Noted    Asthma 01/05/2018    Seasonal allergies 01/05/2018    Encounter for surveillance of vaginal ring hormonal contraceptive device 01/05/2018     Resolved Ambulatory Problems     Diagnosis Date Noted    Acute sinusitis 09/07/2016     Past Medical History:   Diagnosis Date    Fibromyalgia     Fibromyalgia, primary     Headache 2015    Scoliosis 2005         PAST SURGICAL HISTORY  Past Surgical History:   Procedure Laterality Date    EAR TUBES      HAND SURGERY Right     3rd finger, tumor removed    KNEE ARTHROSCOPY Left     WISDOM TOOTH EXTRACTION           FAMILY HISTORY  Family History   Problem Relation Age of Onset    Hypertension Mother     Cancer Mother     Diabetes Father     Hypertension Father     Seizures Sister     Asthma Brother     Heart attack Maternal Grandmother 75    Thyroid disease Paternal Grandmother     Coronary artery  "disease Paternal Grandfather         CABG    Diabetes Paternal Grandfather         smoker    Depression Maternal Aunt     Ovarian cancer Paternal Great-Grandmother     Colon cancer Paternal Great-Grandmother     Stroke Other     Colon cancer Other     Breast cancer Neg Hx          SOCIAL HISTORY  Social History     Socioeconomic History    Marital status: Single   Tobacco Use    Smoking status: Never    Smokeless tobacco: Never   Vaping Use    Vaping status: Never Used   Substance and Sexual Activity    Alcohol use: Yes     Comment: \"socially\"    Drug use: No    Sexual activity: Yes     Partners: Male     Birth control/protection: Condom     Comment: last enounter 3 years ago         ALLERGIES  Patient has no known allergies.        REVIEW OF SYSTEMS  All systems reviewed and negative except for those discussed in HPI.       PHYSICAL EXAM    I have reviewed the triage vital signs and nursing notes.    ED Triage Vitals [05/03/25 1252]   Temp Heart Rate Resp BP SpO2   98.5 °F (36.9 °C) 112 20 178/88 99 %      Temp src Heart Rate Source Patient Position BP Location FiO2 (%)   Tympanic -- -- -- --       Physical Exam  GENERAL: Alert, oriented, not distressed  HENT: head atraumatic, no nuchal rigidity  EYES: no scleral icterus, EOMI  CV: regular rhythm, regular rate, no murmur  RESPIRATORY: normal effort, CTA  MUSCULOSKELETAL: Laceration noted over the PIP joint of the left index finger on the radial side.  Painless flexion and extension.  Sensation intact distally.  NEURO: alert, moves all extremities, follows commands  SKIN: warm, dry      RADIOLOGY  XR Finger 2+ View Left  Result Date: 5/3/2025  XR FINGER 2+ VW LEFT-  Clinical: Laceration  FINDINGS: Skin irregularity at the site of laceration adjacent to the proximal interphalangeal joint which is normal in appearance. No soft tissue foreign body seen. The remainder of the index finger is within normal limits.  This report was finalized on 5/3/2025 1:30 PM by " Akash Bojorquez M.D on Workstation: [id number]        I ordered the above noted radiological studies. Reviewed by me and discussed with radiologist.  See dictation for official radiology interpretation.    Laceration Repair    Date/Time: 5/3/2025 3:11 PM    Performed by: Remi Mcnally PA  Authorized by: Hector Driscoll MD    Consent:     Consent obtained:  Verbal    Consent given by:  Patient  Universal protocol:     Patient identity confirmed:  Verbally with patient  Anesthesia:     Anesthesia method:  Local infiltration    Local anesthetic:  Lidocaine 1% w/o epi  Laceration details:     Length (cm):  2.2  Pre-procedure details:     Preparation:  Patient was prepped and draped in usual sterile fashion and imaging obtained to evaluate for foreign bodies  Exploration:     Hemostasis achieved with:  Direct pressure    Wound extent: no nerve damage noted and no tendon damage noted    Treatment:     Area cleansed with:  Chlorhexidine    Amount of cleaning:  Standard    Irrigation solution:  Sterile saline  Skin repair:     Repair method:  Sutures    Suture size:  5-0    Suture material:  Nylon    Number of sutures:  4  Approximation:     Approximation:  Close  Repair type:     Repair type:  Simple  Post-procedure details:     Dressing:  Antibiotic ointment    Procedure completion:  Tolerated well, no immediate complications          MEDICATIONS GIVEN IN ER    Medications   lidocaine (XYLOCAINE) 1 % injection 10 mL (has no administration in time range)   Tetanus-Diphth-Acell Pertussis (BOOSTRIX) injection 0.5 mL (0.5 mL Intramuscular Given 5/3/25 1318)         ADDITIONAL ORDERS CONSIDERED BUT NOT ORDERED:  Admission was considered but after careful review of the patient's presentation, physical examination, diagnostic results, and response to treatment the patient may be safely discharged with outpatient follow-up.       PROGRESS, DATA ANALYSIS, CONSULTS, AND MEDICAL DECISION MAKING    All labs have been independently  interpreted by myself.  All radiology studies have been independently interpreted by myself and discussed with radiologist dictating the report.   EKGs independently interpreted by myself.  Discussion below represents my analysis of pertinent findings related to patient's condition, differential diagnosis, treatment plan and final disposition.    I have discussed case with Dr. Driscoll, emergency room physician.  He has performed his own bedside examination and agrees with treatment plan.    ED Course as of 05/03/25 1516   Sat May 03, 2025   1310 Patient presents with laceration to the left index finger.  Neurovascularly intact distally.  No obvious tendon injury.  Plan for x-rays, wound closure. [EE]   1327 Finger images independently interpreted myself show no evidence of acute fracture. [EE]      ED Course User Index  [EE] Remi Mcnally PA       AS OF 15:16 EDT VITALS:    BP - 178/88  HR - 112  TEMP - 98.5 °F (36.9 °C) (Tympanic)  O2 SATS - 99%        DIAGNOSIS  Final diagnoses:   Laceration of left index finger without foreign body without damage to nail, initial encounter         DISPOSITION  Discharged    Admission was considered but after careful review of the patient's presentation, physical examination, diagnostic results, and response to treatment the patient may be safely discharged with outpatient follow-up.         Dictated utilizing Dragon dictation     Remi Mcnally PA  05/03/25 1516

## 2025-05-13 ENCOUNTER — OFFICE VISIT (OUTPATIENT)
Dept: FAMILY MEDICINE CLINIC | Facility: CLINIC | Age: 30
End: 2025-05-13
Payer: COMMERCIAL

## 2025-05-13 VITALS
WEIGHT: 143 LBS | TEMPERATURE: 97.8 F | SYSTOLIC BLOOD PRESSURE: 110 MMHG | OXYGEN SATURATION: 97 % | DIASTOLIC BLOOD PRESSURE: 66 MMHG | HEIGHT: 65 IN | HEART RATE: 78 BPM | BODY MASS INDEX: 23.82 KG/M2

## 2025-05-13 DIAGNOSIS — Z71.84 TRAVEL ADVICE ENCOUNTER: ICD-10-CM

## 2025-05-13 DIAGNOSIS — S61.211D LACERATION OF LEFT INDEX FINGER WITHOUT FOREIGN BODY WITHOUT DAMAGE TO NAIL, SUBSEQUENT ENCOUNTER: ICD-10-CM

## 2025-05-13 DIAGNOSIS — Z48.02 VISIT FOR SUTURE REMOVAL: Primary | ICD-10-CM

## 2025-05-13 NOTE — PROGRESS NOTES
"Chief Complaint  Suture / Staple Removal (Left hand index finger suture removal no complains )    Subjective        Jeanne Wagner presents to [facility]  History of Present Illness    History of Present Illness  The patient presents for evaluation of a finger laceration.    Finger Laceration  She sustained a finger laceration on 05/03/2025 from a butter knife while cutting frozen butter. Initially perceived as minor, she later sought ER care and received a tetanus injection. Days later, she experienced numbness, prompting this visit. The wound has healed well, with sensation at the tips, but discomfort when bending the finger. She plans a trip to [place] tomorrow and purchased Liquid Band-Aid. She inquires about its necessity, safety of immersing the finger in water, keeping the finger straight, and sutures readiness for removal. She also asks about showering and hair washing assistance. She ran out of antibiotic cream 2 days ago. History of suture removal from knee and face.  - Onset: Sustained on 05/03/2025.  - Location: Finger.  - Duration: Days later experienced numbness.  - Character: Numbness, discomfort when bending the finger.  - Alleviating/Aggravating Factors: Ran out of antibiotic cream 2 days ago.  - Severity: Initially perceived as minor, later sought ER care.    ALLERGIES  - Allergic to IODINE    IMMUNIZATIONS  She received a tetanus shot.       Objective   Vital Signs:  /66   Pulse 78   Temp 97.8 °F (36.6 °C)   Ht 165.1 cm (65\")   Wt 64.9 kg (143 lb)   SpO2 97%   BMI 23.80 kg/m²   Estimated body mass index is 23.8 kg/m² as calculated from the following:    Height as of this encounter: 165.1 cm (65\").    Weight as of this encounter: 64.9 kg (143 lb).    BMI is within normal parameters. No other follow-up for BMI required.      Physical Exam   Physical Exam  The finger laceration on the left index finger at the distal phalanx is healing well, no evidence of " infection.  After removal of sutures there is one half of the laceration that does seem to separate when flexed.  Neurovascular intact      Result Review :         Suture Removal    Date/Time: 5/13/2025 6:45 PM    Performed by: Giana Perkins MD  Authorized by: Giana Perkins MD  Consent: Verbal consent obtained  Risks and benefits: risks, benefits and alternatives were discussed  Consent given by: patient  Patient identity confirmed: verbally with patient  Body area: upper extremity  Location details: left index finger  Wound Appearance: clean  Sutures Removed: 4  Post-removal: Steri-Strips applied (Dermabond applied, the area had not completely approximated on half of the laceration)  Patient tolerance: Patient tolerated the procedure well with no immediate complications              Assessment and Plan   Diagnoses and all orders for this visit:    1. Visit for suture removal (Primary)  -     Suture Removal    2. Laceration of left index finger without foreign body without damage to nail, subsequent encounter    3. Travel advice encounter  -     amoxicillin-clavulanate (AUGMENTIN) 875-125 MG per tablet; Take 1 tablet by mouth 2 (Two) Times a Day for 7 days.  Dispense: 14 tablet; Refill: 0           Assessment & Plan  1. Finger laceration  - Wound healing well, except for a spot that separates when flexed evidence after the sutures were removed, therefore applied Dermabond and Steri-Strips  - Numbness likely due to nerve damage, expected to resolve  - Use finger condom for protection and water while on vacation this next week  - Steri-Strip applied for support  - Avoid antibiotic cream to prevent interference with glue adhesion  - Hair washing permitted with caution  - Provided remaining remaining Steri-Strips for home use  - Prescription for Augmentin sent to [facility] for potential infection as she is traveling to [place] tomorrow    PROCEDURE  Sutures removed from finger laceration. Steri-Strip applied  for support.      Follow Up   Return if symptoms worsen or fail to improve.  Patient was given instructions and counseling regarding her condition or for health maintenance advice. Please see specific information pulled into the AVS if appropriate.         Giana Perkins MD      Patient or patient representative verbalized consent for the use of Ambient Listening during the visit with  Giana Perkins MD for chart documentation. 5/13/2025  18:45 EDT